# Patient Record
Sex: FEMALE | Race: WHITE | NOT HISPANIC OR LATINO | Employment: OTHER | URBAN - METROPOLITAN AREA
[De-identification: names, ages, dates, MRNs, and addresses within clinical notes are randomized per-mention and may not be internally consistent; named-entity substitution may affect disease eponyms.]

---

## 2019-08-18 ENCOUNTER — APPOINTMENT (OUTPATIENT)
Dept: RADIOLOGY | Facility: MEDICAL CENTER | Age: 84
DRG: 023 | End: 2019-08-18
Attending: EMERGENCY MEDICINE
Payer: MEDICARE

## 2019-08-18 ENCOUNTER — HOSPITAL ENCOUNTER (OUTPATIENT)
Dept: RADIOLOGY | Facility: MEDICAL CENTER | Age: 84
End: 2019-08-18

## 2019-08-18 ENCOUNTER — APPOINTMENT (OUTPATIENT)
Dept: RADIOLOGY | Facility: MEDICAL CENTER | Age: 84
DRG: 023 | End: 2019-08-18
Attending: RADIOLOGY
Payer: MEDICARE

## 2019-08-18 ENCOUNTER — HOSPITAL ENCOUNTER (INPATIENT)
Facility: MEDICAL CENTER | Age: 84
LOS: 2 days | DRG: 023 | End: 2019-08-20
Attending: EMERGENCY MEDICINE | Admitting: HOSPITALIST
Payer: MEDICARE

## 2019-08-18 ENCOUNTER — HOSPITAL ENCOUNTER (OUTPATIENT)
Facility: MEDICAL CENTER | Age: 84
End: 2019-08-18

## 2019-08-18 DIAGNOSIS — I63.322 CEREBROVASCULAR ACCIDENT (CVA) DUE TO THROMBOSIS OF LEFT ANTERIOR CEREBRAL ARTERY (HCC): ICD-10-CM

## 2019-08-18 DIAGNOSIS — I48.91 NEW ONSET A-FIB (HCC): ICD-10-CM

## 2019-08-18 PROBLEM — G81.91 RIGHT HEMIPARESIS (HCC): Status: ACTIVE | Noted: 2019-08-18

## 2019-08-18 PROBLEM — I66.12: Status: ACTIVE | Noted: 2019-08-18

## 2019-08-18 PROBLEM — N17.9 AKI (ACUTE KIDNEY INJURY) (HCC): Status: ACTIVE | Noted: 2019-08-18

## 2019-08-18 PROBLEM — N19 RENAL FAILURE: Status: ACTIVE | Noted: 2019-08-18

## 2019-08-18 PROBLEM — I10 HTN (HYPERTENSION): Status: ACTIVE | Noted: 2019-08-18

## 2019-08-18 LAB
ABO + RH BLD: NORMAL
ABO GROUP BLD: NORMAL
ALBUMIN SERPL BCP-MCNC: 4 G/DL (ref 3.2–4.9)
ALBUMIN/GLOB SERPL: 1.3 G/DL
ALP SERPL-CCNC: 39 U/L (ref 30–99)
ALT SERPL-CCNC: 15 U/L (ref 2–50)
ANION GAP SERPL CALC-SCNC: 10 MMOL/L (ref 0–11.9)
APPEARANCE UR: CLEAR
APTT PPP: 50.1 SEC (ref 24.7–36)
AST SERPL-CCNC: 22 U/L (ref 12–45)
BACTERIA #/AREA URNS HPF: NEGATIVE /HPF
BASOPHILS # BLD AUTO: 0.4 % (ref 0–1.8)
BASOPHILS # BLD: 0.04 K/UL (ref 0–0.12)
BILIRUB SERPL-MCNC: 1 MG/DL (ref 0.1–1.5)
BILIRUB UR QL STRIP.AUTO: NEGATIVE
BLD GP AB SCN SERPL QL: NORMAL
BUN SERPL-MCNC: 16 MG/DL (ref 8–22)
CALCIUM SERPL-MCNC: 8.8 MG/DL (ref 8.5–10.5)
CHLORIDE SERPL-SCNC: 102 MMOL/L (ref 96–112)
CO2 SERPL-SCNC: 25 MMOL/L (ref 20–33)
COLOR UR: YELLOW
CREAT SERPL-MCNC: 0.96 MG/DL (ref 0.5–1.4)
EOSINOPHIL # BLD AUTO: 0.03 K/UL (ref 0–0.51)
EOSINOPHIL NFR BLD: 0.3 % (ref 0–6.9)
EPI CELLS #/AREA URNS HPF: NEGATIVE /HPF
ERYTHROCYTE [DISTWIDTH] IN BLOOD BY AUTOMATED COUNT: 42.9 FL (ref 35.9–50)
GLOBULIN SER CALC-MCNC: 3.2 G/DL (ref 1.9–3.5)
GLUCOSE SERPL-MCNC: 96 MG/DL (ref 65–99)
GLUCOSE UR STRIP.AUTO-MCNC: NEGATIVE MG/DL
HCT VFR BLD AUTO: 40.2 % (ref 37–47)
HGB BLD-MCNC: 13.5 G/DL (ref 12–16)
HYALINE CASTS #/AREA URNS LPF: ABNORMAL /LPF
IMM GRANULOCYTES # BLD AUTO: 0.03 K/UL (ref 0–0.11)
IMM GRANULOCYTES NFR BLD AUTO: 0.3 % (ref 0–0.9)
INR PPP: 1.03 (ref 0.87–1.13)
KETONES UR STRIP.AUTO-MCNC: ABNORMAL MG/DL
LEUKOCYTE ESTERASE UR QL STRIP.AUTO: NEGATIVE
LYMPHOCYTES # BLD AUTO: 1.07 K/UL (ref 1–4.8)
LYMPHOCYTES NFR BLD: 11 % (ref 22–41)
MAGNESIUM SERPL-MCNC: 1.7 MG/DL (ref 1.5–2.5)
MCH RBC QN AUTO: 31.7 PG (ref 27–33)
MCHC RBC AUTO-ENTMCNC: 33.6 G/DL (ref 33.6–35)
MCV RBC AUTO: 94.4 FL (ref 81.4–97.8)
MICRO URNS: ABNORMAL
MONOCYTES # BLD AUTO: 0.65 K/UL (ref 0–0.85)
MONOCYTES NFR BLD AUTO: 6.7 % (ref 0–13.4)
NEUTROPHILS # BLD AUTO: 7.93 K/UL (ref 2–7.15)
NEUTROPHILS NFR BLD: 81.3 % (ref 44–72)
NITRITE UR QL STRIP.AUTO: NEGATIVE
NRBC # BLD AUTO: 0 K/UL
NRBC BLD-RTO: 0 /100 WBC
PH UR STRIP.AUTO: 8.5 [PH] (ref 5–8)
PLATELET # BLD AUTO: 225 K/UL (ref 164–446)
PMV BLD AUTO: 9.8 FL (ref 9–12.9)
POTASSIUM SERPL-SCNC: 3.4 MMOL/L (ref 3.6–5.5)
PROT SERPL-MCNC: 7.2 G/DL (ref 6–8.2)
PROT UR QL STRIP: NEGATIVE MG/DL
PROTHROMBIN TIME: 13.8 SEC (ref 12–14.6)
RBC # BLD AUTO: 4.26 M/UL (ref 4.2–5.4)
RBC # URNS HPF: ABNORMAL /HPF
RBC UR QL AUTO: ABNORMAL
RH BLD: NORMAL
SODIUM SERPL-SCNC: 137 MMOL/L (ref 135–145)
SP GR UR STRIP.AUTO: 1.02
T4 FREE SERPL-MCNC: 0.99 NG/DL (ref 0.53–1.43)
TROPONIN T SERPL-MCNC: 15 NG/L (ref 6–19)
TSH SERPL DL<=0.005 MIU/L-ACNC: 3.38 UIU/ML (ref 0.38–5.33)
UROBILINOGEN UR STRIP.AUTO-MCNC: 0.2 MG/DL
WBC # BLD AUTO: 9.8 K/UL (ref 4.8–10.8)
WBC #/AREA URNS HPF: ABNORMAL /HPF

## 2019-08-18 PROCEDURE — B3141ZZ FLUOROSCOPY OF LEFT COMMON CAROTID ARTERY USING LOW OSMOLAR CONTRAST: ICD-10-PCS | Performed by: RADIOLOGY

## 2019-08-18 PROCEDURE — 96375 TX/PRO/DX INJ NEW DRUG ADDON: CPT

## 2019-08-18 PROCEDURE — 84443 ASSAY THYROID STIM HORMONE: CPT

## 2019-08-18 PROCEDURE — 71045 X-RAY EXAM CHEST 1 VIEW: CPT

## 2019-08-18 PROCEDURE — 99285 EMERGENCY DEPT VISIT HI MDM: CPT | Performed by: PSYCHIATRY & NEUROLOGY

## 2019-08-18 PROCEDURE — 81001 URINALYSIS AUTO W/SCOPE: CPT

## 2019-08-18 PROCEDURE — 96374 THER/PROPH/DIAG INJ IV PUSH: CPT

## 2019-08-18 PROCEDURE — 86901 BLOOD TYPING SEROLOGIC RH(D): CPT

## 2019-08-18 PROCEDURE — 700101 HCHG RX REV CODE 250: Performed by: INTERNAL MEDICINE

## 2019-08-18 PROCEDURE — 70450 CT HEAD/BRAIN W/O DYE: CPT

## 2019-08-18 PROCEDURE — 93005 ELECTROCARDIOGRAM TRACING: CPT | Performed by: EMERGENCY MEDICINE

## 2019-08-18 PROCEDURE — 86900 BLOOD TYPING SEROLOGIC ABO: CPT

## 2019-08-18 PROCEDURE — 84484 ASSAY OF TROPONIN QUANT: CPT | Mod: 91

## 2019-08-18 PROCEDURE — 85025 COMPLETE CBC W/AUTO DIFF WBC: CPT | Mod: 91

## 2019-08-18 PROCEDURE — 99291 CRITICAL CARE FIRST HOUR: CPT

## 2019-08-18 PROCEDURE — 0042T CT-CEREBRAL PERFUSION ANALYSIS: CPT

## 2019-08-18 PROCEDURE — 700111 HCHG RX REV CODE 636 W/ 250 OVERRIDE (IP)

## 2019-08-18 PROCEDURE — 85730 THROMBOPLASTIN TIME PARTIAL: CPT

## 2019-08-18 PROCEDURE — B3171ZZ FLUOROSCOPY OF LEFT INTERNAL CAROTID ARTERY USING LOW OSMOLAR CONTRAST: ICD-10-PCS | Performed by: RADIOLOGY

## 2019-08-18 PROCEDURE — 36415 COLL VENOUS BLD VENIPUNCTURE: CPT

## 2019-08-18 PROCEDURE — 70496 CT ANGIOGRAPHY HEAD: CPT

## 2019-08-18 PROCEDURE — 700101 HCHG RX REV CODE 250

## 2019-08-18 PROCEDURE — 84439 ASSAY OF FREE THYROXINE: CPT

## 2019-08-18 PROCEDURE — 80053 COMPREHEN METABOLIC PANEL: CPT | Mod: 91

## 2019-08-18 PROCEDURE — 99153 MOD SED SAME PHYS/QHP EA: CPT

## 2019-08-18 PROCEDURE — 85610 PROTHROMBIN TIME: CPT

## 2019-08-18 PROCEDURE — 83735 ASSAY OF MAGNESIUM: CPT

## 2019-08-18 PROCEDURE — 70498 CT ANGIOGRAPHY NECK: CPT

## 2019-08-18 PROCEDURE — 99291 CRITICAL CARE FIRST HOUR: CPT | Performed by: INTERNAL MEDICINE

## 2019-08-18 PROCEDURE — 03CG3ZZ EXTIRPATION OF MATTER FROM INTRACRANIAL ARTERY, PERCUTANEOUS APPROACH: ICD-10-PCS | Performed by: RADIOLOGY

## 2019-08-18 PROCEDURE — 86850 RBC ANTIBODY SCREEN: CPT

## 2019-08-18 PROCEDURE — 700105 HCHG RX REV CODE 258: Performed by: HOSPITALIST

## 2019-08-18 PROCEDURE — 700105 HCHG RX REV CODE 258: Performed by: INTERNAL MEDICINE

## 2019-08-18 PROCEDURE — 94760 N-INVAS EAR/PLS OXIMETRY 1: CPT

## 2019-08-18 PROCEDURE — 700117 HCHG RX CONTRAST REV CODE 255: Performed by: EMERGENCY MEDICINE

## 2019-08-18 PROCEDURE — 770022 HCHG ROOM/CARE - ICU (200)

## 2019-08-18 RX ORDER — ASPIRIN 81 MG/1
324 TABLET, CHEWABLE ORAL DAILY
Status: DISCONTINUED | OUTPATIENT
Start: 2019-08-19 | End: 2019-08-19

## 2019-08-18 RX ORDER — ASPIRIN 300 MG/1
300 SUPPOSITORY RECTAL DAILY
Status: DISCONTINUED | OUTPATIENT
Start: 2019-08-19 | End: 2019-08-19

## 2019-08-18 RX ORDER — AMOXICILLIN 250 MG
2 CAPSULE ORAL 2 TIMES DAILY
Status: DISCONTINUED | OUTPATIENT
Start: 2019-08-18 | End: 2019-08-19

## 2019-08-18 RX ORDER — LABETALOL HYDROCHLORIDE 5 MG/ML
10 INJECTION, SOLUTION INTRAVENOUS ONCE
Status: COMPLETED | OUTPATIENT
Start: 2019-08-18 | End: 2019-08-18

## 2019-08-18 RX ORDER — SODIUM CHLORIDE 9 MG/ML
500 INJECTION, SOLUTION INTRAVENOUS
Status: ACTIVE | OUTPATIENT
Start: 2019-08-18 | End: 2019-08-18

## 2019-08-18 RX ORDER — ONDANSETRON 2 MG/ML
4 INJECTION INTRAMUSCULAR; INTRAVENOUS PRN
Status: ACTIVE | OUTPATIENT
Start: 2019-08-18 | End: 2019-08-18

## 2019-08-18 RX ORDER — LABETALOL HYDROCHLORIDE 5 MG/ML
INJECTION, SOLUTION INTRAVENOUS
Status: COMPLETED
Start: 2019-08-18 | End: 2019-08-18

## 2019-08-18 RX ORDER — ASPIRIN 325 MG
325 TABLET ORAL DAILY
Status: DISCONTINUED | OUTPATIENT
Start: 2019-08-19 | End: 2019-08-19

## 2019-08-18 RX ORDER — BISACODYL 10 MG
10 SUPPOSITORY, RECTAL RECTAL
Status: DISCONTINUED | OUTPATIENT
Start: 2019-08-18 | End: 2019-08-19

## 2019-08-18 RX ORDER — NIFEDIPINE 30 MG
30 TABLET, EXTENDED RELEASE ORAL DAILY
Status: ON HOLD | COMMUNITY
Start: 2019-07-29 | End: 2019-08-20

## 2019-08-18 RX ORDER — GABAPENTIN 100 MG/1
100 CAPSULE ORAL 3 TIMES DAILY
Status: DISCONTINUED | OUTPATIENT
Start: 2019-08-18 | End: 2019-08-19

## 2019-08-18 RX ORDER — LABETALOL HYDROCHLORIDE 5 MG/ML
10 INJECTION, SOLUTION INTRAVENOUS
Status: DISCONTINUED | OUTPATIENT
Start: 2019-08-18 | End: 2019-08-18

## 2019-08-18 RX ORDER — POLYETHYLENE GLYCOL 3350 17 G/17G
1 POWDER, FOR SOLUTION ORAL
Status: DISCONTINUED | OUTPATIENT
Start: 2019-08-18 | End: 2019-08-19

## 2019-08-18 RX ORDER — MIDAZOLAM HYDROCHLORIDE 1 MG/ML
.5-2 INJECTION INTRAMUSCULAR; INTRAVENOUS PRN
Status: ACTIVE | OUTPATIENT
Start: 2019-08-18 | End: 2019-08-18

## 2019-08-18 RX ORDER — HYDRALAZINE HYDROCHLORIDE 20 MG/ML
20 INJECTION INTRAMUSCULAR; INTRAVENOUS EVERY 4 HOURS PRN
Status: DISCONTINUED | OUTPATIENT
Start: 2019-08-18 | End: 2019-08-18

## 2019-08-18 RX ORDER — ONDANSETRON 4 MG/1
4 TABLET, ORALLY DISINTEGRATING ORAL EVERY 4 HOURS PRN
Status: DISCONTINUED | OUTPATIENT
Start: 2019-08-18 | End: 2019-08-19

## 2019-08-18 RX ORDER — LEVOTHYROXINE SODIUM 0.03 MG/1
100 TABLET ORAL
Status: DISCONTINUED | OUTPATIENT
Start: 2019-08-19 | End: 2019-08-19

## 2019-08-18 RX ORDER — MIDAZOLAM HYDROCHLORIDE 1 MG/ML
INJECTION INTRAMUSCULAR; INTRAVENOUS
Status: COMPLETED
Start: 2019-08-18 | End: 2019-08-18

## 2019-08-18 RX ORDER — HYDRALAZINE HYDROCHLORIDE 20 MG/ML
20 INJECTION INTRAMUSCULAR; INTRAVENOUS EVERY 6 HOURS PRN
Status: DISCONTINUED | OUTPATIENT
Start: 2019-08-18 | End: 2019-08-19

## 2019-08-18 RX ORDER — CHLORAL HYDRATE 500 MG
1000 CAPSULE ORAL DAILY
Status: ON HOLD | COMMUNITY
End: 2019-08-20

## 2019-08-18 RX ORDER — ACETAMINOPHEN 325 MG/1
650 TABLET ORAL EVERY 6 HOURS PRN
Status: DISCONTINUED | OUTPATIENT
Start: 2019-08-18 | End: 2019-08-19

## 2019-08-18 RX ORDER — LABETALOL HYDROCHLORIDE 5 MG/ML
10 INJECTION, SOLUTION INTRAVENOUS
Status: DISCONTINUED | OUTPATIENT
Start: 2019-08-18 | End: 2019-08-19

## 2019-08-18 RX ORDER — SODIUM CHLORIDE, SODIUM LACTATE, POTASSIUM CHLORIDE, CALCIUM CHLORIDE 600; 310; 30; 20 MG/100ML; MG/100ML; MG/100ML; MG/100ML
INJECTION, SOLUTION INTRAVENOUS CONTINUOUS
Status: DISCONTINUED | OUTPATIENT
Start: 2019-08-18 | End: 2019-08-19

## 2019-08-18 RX ORDER — ONDANSETRON 2 MG/ML
4 INJECTION INTRAMUSCULAR; INTRAVENOUS EVERY 4 HOURS PRN
Status: DISCONTINUED | OUTPATIENT
Start: 2019-08-18 | End: 2019-08-19

## 2019-08-18 RX ORDER — GABAPENTIN 300 MG/1
CAPSULE ORAL
COMMUNITY
Start: 2019-06-15 | End: 2019-08-18

## 2019-08-18 RX ORDER — ASPIRIN 325 MG
325 TABLET ORAL EVERY 6 HOURS PRN
Status: ON HOLD | COMMUNITY
End: 2019-08-20

## 2019-08-18 RX ADMIN — LABETALOL HYDROCHLORIDE 10 MG: 5 INJECTION, SOLUTION INTRAVENOUS at 19:06

## 2019-08-18 RX ADMIN — LABETALOL HYDROCHLORIDE 10 MG: 5 INJECTION, SOLUTION INTRAVENOUS at 13:03

## 2019-08-18 RX ADMIN — SODIUM CHLORIDE, POTASSIUM CHLORIDE, SODIUM LACTATE AND CALCIUM CHLORIDE: 600; 310; 30; 20 INJECTION, SOLUTION INTRAVENOUS at 13:53

## 2019-08-18 RX ADMIN — SODIUM CHLORIDE 5 MG/HR: 9 INJECTION, SOLUTION INTRAVENOUS at 19:44

## 2019-08-18 RX ADMIN — MIDAZOLAM HYDROCHLORIDE 1 MG: 1 INJECTION INTRAMUSCULAR; INTRAVENOUS at 12:13

## 2019-08-18 RX ADMIN — LABETALOL HYDROCHLORIDE 10 MG: 5 INJECTION, SOLUTION INTRAVENOUS at 13:20

## 2019-08-18 RX ADMIN — SODIUM CHLORIDE, POTASSIUM CHLORIDE, SODIUM LACTATE AND CALCIUM CHLORIDE: 600; 310; 30; 20 INJECTION, SOLUTION INTRAVENOUS at 23:06

## 2019-08-18 RX ADMIN — LABETALOL HYDROCHLORIDE 10 MG: 5 INJECTION, SOLUTION INTRAVENOUS at 15:20

## 2019-08-18 RX ADMIN — FENTANYL CITRATE 25 MCG: 0.05 INJECTION, SOLUTION INTRAMUSCULAR; INTRAVENOUS at 12:21

## 2019-08-18 RX ADMIN — MIDAZOLAM HYDROCHLORIDE 1 MG: 1 INJECTION, SOLUTION INTRAMUSCULAR; INTRAVENOUS at 12:13

## 2019-08-18 RX ADMIN — IOHEXOL 120 ML: 350 INJECTION, SOLUTION INTRAVENOUS at 10:46

## 2019-08-18 ASSESSMENT — PATIENT HEALTH QUESTIONNAIRE - PHQ9
SUM OF ALL RESPONSES TO PHQ9 QUESTIONS 1 AND 2: 0
2. FEELING DOWN, DEPRESSED, IRRITABLE, OR HOPELESS: NOT AT ALL
1. LITTLE INTEREST OR PLEASURE IN DOING THINGS: NOT AT ALL

## 2019-08-18 ASSESSMENT — LIFESTYLE VARIABLES: DOES PATIENT WANT TO STOP DRINKING: NO

## 2019-08-18 NOTE — CONSULTS
Critical Care Consultation    Date of consult: 8/18/2019    Referring Physician  Anika Waite D.O.    Reason for Consultation  Left HAZEL distribution cerebrovascular accident    History of Presenting Illness  89 y.o. female with a past medical history of hypertension, hypothyroidism who presented 8/18/2019 right hemiparesis, patient was last seen normal at approximately 10 PM yesterday and found by daughter this morning with dense right hemiparesis.  The patient was taken to the emergency department where she was found to have new onset atrial fibrillation and TPA was withheld given her unknown last normal time.  Patient was transferred to [facility] for higher level of care, a CTA of the head and neck was completed which showed a complete occlusion of the proximal left HAZEL and no flow limiting stenosis of the carotid or vertebral arteries.  Dr. Cordon with interventional radiology is planning to take the patient for thrombectomy.  She will be admitted to the ICU post procedurally for further care and monitoring stroke.    Per ERP the patient's baseline level of functioning is very high and she was actually hiking yesterday.    Code Status  Full Code    Review of Systems  Review of Systems   Unable to perform ROS: Mental acuity       Past Medical History   has a past medical history of A-fib (HCC), Hypertension, and Hypothyroid.    Surgical History   has no past surgical history on file.    Family History  family history is not on file.    Social History   reports that she has never smoked. She has never used smokeless tobacco. She reports that she does not drink alcohol or use drugs.    Medications  Home Medications     Reviewed by Gregoria Clement, Pharmacy Intern (Pharmacy Intern) on 08/18/19 at 1111  Med List Status: Partial   Medication Last Dose Status   amLODIPine (NORVASC) 10 MG Tab  Active   gabapentin (NEURONTIN) 300 MG Cap  Active   HYDROcodone-acetaminophen (NORCO) 5-325 MG Tab per  tablet  Active   levothyroxine (SYNTHROID) 100 MCG Tab  Active   NIFEdipine (ADALAT CC) 30 MG CR tablet  Active              No current facility-administered medications for this encounter.      Current Outpatient Medications   Medication Sig Dispense Refill   • amLODIPine (NORVASC) 10 MG Tab Take 10 mg by mouth every day. Unknown dosage     • levothyroxine (SYNTHROID) 100 MCG Tab Take 100 mcg by mouth Every morning on an empty stomach. Unknown dosage     • HYDROcodone-acetaminophen (NORCO) 5-325 MG Tab per tablet Take 1-2 Tabs by mouth every four hours as needed.     • NIFEdipine (ADALAT CC) 30 MG CR tablet      • gabapentin (NEURONTIN) 300 MG Cap          Allergies  No Known Allergies    Vital Signs last 24 hours  Temp:  [36.6 °C (97.9 °F)] 36.6 °C (97.9 °F)  Pulse:  [74-85] 85  Resp:  [7-25] 25  BP: (178-191)/() 178/107  SpO2:  [90 %-98 %] 98 %    Physical Exam  Physical Exam   Constitutional: She appears well-developed and well-nourished.   HENT:   Head: Normocephalic and atraumatic.   Right Ear: External ear normal.   Left Ear: External ear normal.   Nose: Nose normal.   Eyes: Pupils are equal, round, and reactive to light. Conjunctivae are normal.   Neck: Neck supple. No JVD present. No tracheal deviation present.   Cardiovascular: Normal rate, regular rhythm and intact distal pulses.   Pulmonary/Chest: Breath sounds normal. No accessory muscle usage. No respiratory distress.   Abdominal: Soft. Bowel sounds are normal. She exhibits no distension. There is no tenderness.   Musculoskeletal: Normal range of motion. She exhibits no tenderness or deformity.   Neurological: A cranial nerve deficit is present. She exhibits abnormal muscle tone. Coordination abnormal.   Dense right upper extremity paraplegia, right lower extremity weakly withdraws to pain.  Gaze deviated left with inability to move past midline. Expressive and receptive aphasia.    Initial NIHSS: 22   Skin: Skin is warm and dry. No rash noted.    Psychiatric: She has a normal mood and affect. Her behavior is normal.   Nursing note and vitals reviewed.      Fluids  No intake or output data in the 24 hours ending 08/18/19 1116    Laboratory  Recent Results (from the past 48 hour(s))   CBC WITH DIFFERENTIAL    Collection Time: 08/18/19  8:45 AM   Result Value Ref Range    WBC 10.2 4.8 - 10.8 K/uL    RBC 4.86 4.20 - 5.40 M/uL    Hemoglobin 15.5 13.0 - 17.0 g/dL    Hematocrit 45.0 39.0 - 50.0 %    MCV 92.6 81.0 - 99.0 fL    MCH 31.9 28.4 - 32.7 pg    MCHC 34.4 33.0 - 37.0 g/dL    RDW 12.4 11.5 - 14.5 %    Platelet Count 250 130 - 400 K/uL    MPV 10.0 7.4 - 10.4 fL    Neutrophils Automated 81.4 (H) 39.0 - 70.0 %    Lymphocytes Automated 9.7 (L) 21.0 - 50.0 %    Monocytes Automated 7.3 1.7 - 10.0 %    Eosinophils Automated 1.0 0.0 - 5.0 %    Basophils Automated 0.6 0.0 - 3.0 %    Abs Neutrophils Automated 8.3 (H) 1.8 - 7.7 K/uL    Abs Lymph Automated 1.0 (L) 1.2 - 4.8 K/uL    Monos (Absolute) 0.7 0.2 - 0.9 K/uL   COMP METABOLIC PANEL    Collection Time: 08/18/19  8:45 AM   Result Value Ref Range    Sodium 136 136 - 145 mmol/L    Potassium 4.3 3.5 - 5.1 mmol/L    Chloride 97 (L) 98 - 107 mmol/L    Co2 30 (H) 20 - 29 mmol/L    Anion Gap 9 4 - 12 mmol/L    Glucose 118 (H) 70 - 100 mg/dL    Bun 20 (H) 6 - 18 mg/dL    Creatinine 1.2 (H) 0.6 - 1.0 mg/dL    Calcium 10.0 8.5 - 10.1 mg/dL    AST(SGOT) 28 10 - 37 U/L    ALT(SGPT) 29 12 - 78 U/L    Alkaline Phosphatase 58 34 - 120 U/L    Total Bilirubin 1.2 0.1 - 1.2 mg/dL    Albumin 4.2 3.5 - 5.0 g/dL    Total Protein 8.6 (H) 6.4 - 8.3 g/dL    A-G Ratio 1.0    TROPONIN    Collection Time: 08/18/19  8:45 AM   Result Value Ref Range    Troponin I <0.02 0.00 - 0.06 ng/mL   ESTIMATED GFR    Collection Time: 08/18/19  8:45 AM   Result Value Ref Range    GFR If  51 (A) >60 mL/min/1.73 m 2    GFR If Non  42 (A) >60 mL/min/1.73 m 2   URINALYSIS CULTURE, IF INDICATED    Collection Time: 08/18/19   9:00 AM   Result Value Ref Range    Color YELLOW     Character CLEAR     Specific Gravity 1.010 <1.035    Ph 7.5 5.0 - 8.0    Glucose NEGATIVE Negative mg/dL    Ketones NEGATIVE Negative mg/dL    Protein TRACE (A) Negative mg/dL    Bilirubin NEGATIVE Negative    Urobilinogen, Urine 0.2 Negative    Nitrite NEGATIVE Negative    Leukocyte Esterase NEGATIVE Negative    Occult Blood TRACE (A) Negative    Culture Indicated No UA Culture   URINE MICROSCOPIC (W/UA)    Collection Time: 08/18/19  9:00 AM   Result Value Ref Range    WBC Rare 0 - 6 /hpf    RBC Rare 0 - 3 /hpf    Bacteria None Seen None /hpf    Epithelial Cells None Seen /hpf    Urine Other See Below    CBC WITH DIFFERENTIAL    Collection Time: 08/18/19 10:26 AM   Result Value Ref Range    WBC 9.8 4.8 - 10.8 K/uL    RBC 4.26 4.20 - 5.40 M/uL    Hemoglobin 13.5 12.0 - 16.0 g/dL    Hematocrit 40.2 37.0 - 47.0 %    MCV 94.4 81.4 - 97.8 fL    MCH 31.7 27.0 - 33.0 pg    MCHC 33.6 33.6 - 35.0 g/dL    RDW 42.9 35.9 - 50.0 fL    Platelet Count 225 164 - 446 K/uL    MPV 9.8 9.0 - 12.9 fL    Neutrophils-Polys 81.30 (H) 44.00 - 72.00 %    Lymphocytes 11.00 (L) 22.00 - 41.00 %    Monocytes 6.70 0.00 - 13.40 %    Eosinophils 0.30 0.00 - 6.90 %    Basophils 0.40 0.00 - 1.80 %    Immature Granulocytes 0.30 0.00 - 0.90 %    Nucleated RBC 0.00 /100 WBC    Neutrophils (Absolute) 7.93 (H) 2.00 - 7.15 K/uL    Lymphs (Absolute) 1.07 1.00 - 4.80 K/uL    Monos (Absolute) 0.65 0.00 - 0.85 K/uL    Eos (Absolute) 0.03 0.00 - 0.51 K/uL    Baso (Absolute) 0.04 0.00 - 0.12 K/uL    Immature Granulocytes (abs) 0.03 0.00 - 0.11 K/uL    NRBC (Absolute) 0.00 K/uL   COMP METABOLIC PANEL    Collection Time: 08/18/19 10:26 AM   Result Value Ref Range    Sodium 137 135 - 145 mmol/L    Potassium 3.4 (L) 3.6 - 5.5 mmol/L    Chloride 102 96 - 112 mmol/L    Co2 25 20 - 33 mmol/L    Anion Gap 10.0 0.0 - 11.9    Glucose 96 65 - 99 mg/dL    Bun 16 8 - 22 mg/dL    Creatinine 0.96 0.50 - 1.40 mg/dL     Calcium 8.8 8.5 - 10.5 mg/dL    AST(SGOT) 22 12 - 45 U/L    ALT(SGPT) 15 2 - 50 U/L    Alkaline Phosphatase 39 30 - 99 U/L    Total Bilirubin 1.0 0.1 - 1.5 mg/dL    Albumin 4.0 3.2 - 4.9 g/dL    Total Protein 7.2 6.0 - 8.2 g/dL    Globulin 3.2 1.9 - 3.5 g/dL    A-G Ratio 1.3 g/dL   PROTHROMBIN TIME    Collection Time: 19 10:26 AM   Result Value Ref Range    PT 13.8 12.0 - 14.6 sec    INR 1.03 0.87 - 1.13   APTT    Collection Time: 19 10:26 AM   Result Value Ref Range    APTT 50.1 (H) 24.7 - 36.0 sec   TROPONIN    Collection Time: 19 10:26 AM   Result Value Ref Range    Troponin T 15 6 - 19 ng/L   ESTIMATED GFR    Collection Time: 19 10:26 AM   Result Value Ref Range    GFR If African American >60 >60 mL/min/1.73 m 2    GFR If Non African American 55 (A) >60 mL/min/1.73 m 2   EKG (NOW)    Collection Time: 19 10:51 AM   Result Value Ref Range    Report       [facility] Emergency Dept[facility]    Test Date:  2019  Pt Name:    CHAD PENDLETON              Department: ER  MRN:        [id number]                      Room:       [facility]  Gender:     Female                       Technician: [id number]  :        [id number]                   Requested By:AISSATOU JOHNSON  Order #:    [id number]                    Reading MD:    Measurements  Intervals                                Axis  Rate:       88                           P:  VA:                                      QRS:        84  QRSD:       84                           T:          62  QT:         392  QTc:        475    Interpretive Statements  ATRIAL FIBRILLATION  BORDERLINE RIGHT AXIS DEVIATION  CONSIDER LEFT VENTRICULAR HYPERTROPHY  No previous ECG available for comparison     URINALYSIS CULTURE, IF INDICATED    Collection Time: 19 11:00 AM   Result Value Ref Range    Color Yellow     Character Clear     Specific Gravity 1.023 <1.035    Ph 8.5 (A) 5.0 - 8.0    Glucose Negative Negative mg/dL    Ketones Trace (A) Negative  mg/dL    Protein Negative Negative mg/dL    Bilirubin Negative Negative    Urobilinogen, Urine 0.2 Negative    Nitrite Negative Negative    Leukocyte Esterase Negative Negative    Occult Blood Trace (A) Negative    Micro Urine Req Microscopic    URINE MICROSCOPIC (W/UA)    Collection Time: 08/18/19 11:00 AM   Result Value Ref Range    WBC 0-2 /hpf    RBC 2-5 (A) /hpf    Bacteria Negative None /hpf    Epithelial Cells Negative /hpf    Hyaline Cast 0-2 /lpf       Imaging  IR-THROMBO MECHANICAL ARTERY,INIT   Final Result         1.  Occlusion of the A2 segment of the left anterior cerebral artery.      2.  Successful cerebral thrombectomy performed with post intervention angiogram demonstrating widely patent anterior cerebral arteries.               DX-CHEST-PORTABLE (1 VIEW)   Final Result         1. No significant interval change. No pulmonary infiltrates or consolidations are noted.      CT-CTA HEAD WITH & W/O-POST PROCESS   Final Result         1. Complete occlusion of the proximal left HAZEL.      CRITICAL RESULT READ BACK: Preliminary findings discussed with and critical read back performed by Dr. AISSATOU JOHNSON in the Emergency Department via telephone on 8/18/2019 10:54 AM . IR is informed      CT-CTA NECK WITH & W/O-POST PROCESSING   Final Result      1. Moderate calcified atherosclerotic disease of the left carotid bulb. No appreciable calcified atherosclerotic disease of the right carotid bulb.      2. No evidence of flow-limiting stenosis in the cervical carotid or cervical vertebral arteries.      CT-CEREBRAL PERFUSION ANALYSIS   Final Result      1.  Cerebral blood flow less than 30% in the anterior medial left frontal lobe, likely representing completed infarct = 8 mL.      2.  T Max more than 6 seconds, in the left frontal lobe, likely representing combination of completed infarct and ischemia = 44 mL.      3.  Mismatched volume likely representing ischemic brain/penumbra = 36      4.  Please note that the  cerebral perfusion was performed on the limited brain tissue around the basal ganglia region. Infarct/ischemia outside the CT perfusion sections can be missed in this study.      CT-HEAD W/O   Final Result         1. No evidence of acute intracranial hemorrhage or mass lesion.      2. White matter lucencies most consistent with chronic small vessel ischemic change.               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  Prognosis poor.    AF (atrial fibrillation) (HCC)- (present on admission)  Assessment & Plan  New diagnosis  Cardiac monitoring  TSH pending  Echo  Rate control, unknown onset      Discussed patient condition and risk of morbidity and/or mortality with Hospitalist, RN, RT, Pharmacy, Charge nurse / hot rounds, Patient, neurology and IR.      The patient remains critically ill.  Critical care time = 48 minutes in directly providing and coordinating critical care and extensive data review.  No time overlap and excludes procedures.

## 2019-08-18 NOTE — PROGRESS NOTES
IR Procedure Note:    Dr. Cordon consented patient's daughter prior to procedure; all questions answered.    Site confirmed with imaging guidance by patient, physician, RT, and RN.     Dr. Cordon completed a carotid cerebral angiogram with mechanical thrombolysis.  The patient tolerated the procedure well; ETCo2 range 28-31, with consistent waveform during the procedure.      Tegaderm, gauze and angioseal applied to right groin, CDI and soft; pressure held x 5 minutes.  Patient alert and following commands, but nonverbal post procedure, patient exhibited episodes of hypertension during and after procedure. Dr. Cordon aware.  Midazolam and fentanyl during procedure. Labetolol post procedure. See flow sheet for vital signs.  Report given to CHRISTIANO Calderon.  [place], ICU RN transported patient to R116 with SaO2 monitor @ 98 % on oxygen via NC on  2 lpm.     Sedation End Time: 1314  Angioseal Deployment time: 1251

## 2019-08-18 NOTE — ED NOTES
Med rec complete per pt family and pill identifying   NKDA  Pt family states pt finished an ABX about a week ago

## 2019-08-18 NOTE — OR SURGEON
Immediate Post- Operative Note        PostOp Diagnosis: Left A-2 Occlusion      Procedure(s): Successful Mechanical Thrombectomy of Left A-2.    Estimated Blood Loss: Less than 5 ml    Complications: None      8/18/2019     1:01 PM     Nahid Cordon

## 2019-08-18 NOTE — ASSESSMENT & PLAN NOTE
Permissive hypertension for the next 24 to 48 hours  On nicardipine drip to keep systolic blood pressure less than 140 and diastolic blood pressure less than 105.

## 2019-08-18 NOTE — H&P
Hospital Medicine History & Physical Note    Date of Service  8/18/2019    Primary Care Physician  Pcp Not In Computer    Consultants  Neurology    Code Status  Full    Chief Complaint  Right hemiparesis    History of Presenting Illness  89 y.o. female who presented 8/18/2019.  She is apparently quite active and lives independently.  She had 2 miles only yesterday.  History is obtained from discussion with EMS, the family is not currently present the patient is unable to participate.  Apparently she had been a little bit more fatigued yesterday than his normal but was otherwise feeling in her normal state of health.  She was last seen well at around 10:00 last night.  Her daughter found her this morning unresponsive.  EMS was called and they found the patient with right hemiparesis.  Her telemetry monitoring was significant for atrial fibrillation.  This is apparently a new diagnosis for her.  She had rates under 100.    Emergent imaging here in the ED has demonstrated an acute left HAZEL occlusion.  She is felt not to be a TPA candidate given the unknown time of onset.  IR has evaluated the imaging and is coming in to do an emergent thrombectomy.  She has remained in atrial fibrillation with controlled rates.    Discussed with neurology and pulmonology critical care    Review of Systems  Review of Systems   Unable to perform ROS: Other       Past Medical History   has a past medical history of A-fib (HCC), Hypertension, and Hypothyroid.    Surgical History  Unable to determine as patient is nonverbal and family not available    Family History  Unable to determine as patient is nonverbal and family not available.  Likely not to be of any relevance in a 89-year-old female    Social History   reports that she has never smoked. She has never used smokeless tobacco. She reports that she does not drink alcohol or use drugs.    Allergies  No Known Allergies    Medications  Prior to Admission Medications   Prescriptions Last  Dose Informant Patient Reported? Taking?   HYDROcodone-acetaminophen (NORCO) 5-325 MG Tab per tablet   Yes No   Sig: Take 1-2 Tabs by mouth every four hours as needed.   NIFEdipine (ADALAT CC) 30 MG CR tablet   Yes No   amLODIPine (NORVASC) 10 MG Tab   Yes No   Sig: Take 10 mg by mouth every day. Unknown dosage   gabapentin (NEURONTIN) 300 MG Cap   Yes No   levothyroxine (SYNTHROID) 100 MCG Tab   Yes No   Sig: Take 100 mcg by mouth Every morning on an empty stomach. Unknown dosage      Facility-Administered Medications: None       Physical Exam  Temp:  [36.6 °C (97.9 °F)] 36.6 °C (97.9 °F)  Pulse:  [74-85] 85  Resp:  [7-25] 25  BP: (178-191)/() 178/107  SpO2:  [90 %-98 %] 98 %    Physical Exam   Constitutional: She appears well-developed and well-nourished. No distress.   HENT:   Head: Normocephalic and atraumatic.   Nose: Nose normal.   Mouth/Throat: Oropharynx is clear and moist.   Eyes: Conjunctivae are normal. No scleral icterus.   Neck: No JVD present.   Cardiovascular: Normal rate. An irregularly irregular rhythm present.   Murmur heard.  Atrial fibrillation with rates in the 70s to 80s on the monitor   Pulmonary/Chest: Effort normal. No stridor. No respiratory distress. She has no wheezes. She has no rales.   Abdominal: Soft. There is no tenderness. There is no rebound and no guarding.   Musculoskeletal: She exhibits no edema.   Neurological: She is alert.   Patient does not move right side voluntarily on my examination.  Left side strength appears to be normal however she has dense expressive and receptive aphasia and only follows visual cues.  Exam is therefore limited.  Slight right facial droop.  Withdraws right foot pain   Skin: Skin is warm and dry. No rash noted. She is not diaphoretic.   Nursing note and vitals reviewed.      Laboratory:  Recent Labs     08/18/19  0845 08/18/19  1026   WBC 10.2 9.8   RBC 4.86 4.26   HEMOGLOBIN 15.5 13.5   HEMATOCRIT 45.0 40.2   MCV 92.6 94.4   MCH 31.9 31.7    MCHC 34.4 33.6   RDW 12.4 42.9   PLATELETCT 250 225   MPV 10.0 9.8     Recent Labs     08/18/19  0845 08/18/19  1026   SODIUM 136 137   POTASSIUM 4.3 3.4*   CHLORIDE 97* 102   CO2 30* 25   GLUCOSE 118* 96   BUN 20* 16   CREATININE 1.2* 0.96   CALCIUM 10.0 8.8     Recent Labs     08/18/19  0845 08/18/19  1026   ALTSGPT 29 15   ASTSGOT 28 22   ALKPHOSPHAT 58 39   TBILIRUBIN 1.2 1.0   GLUCOSE 118* 96     Recent Labs     08/18/19  1026   APTT 50.1*   INR 1.03     No results for input(s): NTPROBNP in the last 72 hours.      Recent Labs     08/18/19  1026   TROPONINT 15       Urinalysis:    Recent Labs     08/18/19  1100   SPECGRAVITY 1.023   GLUCOSEUR Negative   KETONES Trace*   NITRITE Negative   LEUKESTERAS Negative   WBCURINE 0-2   RBCURINE 2-5*   BACTERIA Negative   EPITHELCELL Negative        Imaging:  CT-CTA HEAD WITH & W/O-POST PROCESS   Final Result         1. Complete occlusion of the proximal left HAZEL.      CRITICAL RESULT READ BACK: Preliminary findings discussed with and critical read back performed by Dr. AISSATOU JOHNSON in the Emergency Department via telephone on 8/18/2019 10:54 AM . IR is informed      CT-CTA NECK WITH & W/O-POST PROCESSING   Final Result      1. Moderate calcified atherosclerotic disease of the left carotid bulb. No appreciable calcified atherosclerotic disease of the right carotid bulb.      2. No evidence of flow-limiting stenosis in the cervical carotid or cervical vertebral arteries.      CT-CEREBRAL PERFUSION ANALYSIS   Final Result      1.  Cerebral blood flow less than 30% in the anterior medial left frontal lobe, likely representing completed infarct = 8 mL.      2.  T Max more than 6 seconds, in the left frontal lobe, likely representing combination of completed infarct and ischemia = 44 mL.      3.  Mismatched volume likely representing ischemic brain/penumbra = 36      4.  Please note that the cerebral perfusion was performed on the limited brain tissue around the basal ganglia  region. Infarct/ischemia outside the CT perfusion sections can be missed in this study.      CT-HEAD W/O   Final Result         1. No evidence of acute intracranial hemorrhage or mass lesion.      2. White matter lucencies most consistent with chronic small vessel ischemic change.               DX-CHEST-PORTABLE (1 VIEW)    (Results Pending)   EC-ECHOCARDIOGRAM COMPLETE W/O CONT    (Results Pending)   MR-BRAIN-W/O    (Results Pending)         Assessment/Plan:  I anticipate this patient will require at least two midnights for appropriate medical management, necessitating inpatient admission.    No new Assessment & Plan notes have been filed under this hospital service since the last note was generated.  Service: Hospital Medicine      VTE prophylaxis: Patient will eventually need full dose anti-coagulation

## 2019-08-18 NOTE — THERAPY
Speech Therapy Contact Note:  Order received and verified for a clinical swallow evaluation. Per RN, pt not awake, non-verbal, and not following commands so is not appropriate for swallow evaluation at this time. SLP to follow as pt is appropriate.

## 2019-08-18 NOTE — ASSESSMENT & PLAN NOTE
Secondary to the left HAZEL occlusion.  MRI showing moderate to large left frontal/temporal infarct with component of hemorrhagic conversion.  Await further input from neurology but family looking toward potential hospice.  Will place orders for case management to look toward possible home hospice setting.

## 2019-08-18 NOTE — ED PROVIDER NOTES
ED Provider Note    ED Provider Note    Primary care provider: Pcp Not In Computer  Means of arrival: EMS, transfer  History obtained from: chart, records from transferring facility, daughter  History limited by: Medical condition    CHIEF COMPLAINT  Chief Complaint   Patient presents with   • Possible Stroke     stroke transfer from [facility], Last Known Well at 10pm last night woke up having right sided weakness and aphasia. has NIHSS of 24 from transferring facility. has hx of HTN/thyroid disease/New onset of afib.       GENEVA Carolina is a 89 y.o. female who presents to the Emergency Department via EMS transfer from [facility].  This is a previously healthy 9-year-old female who was last seen well at 10 PM last night.  Her daughter found her this morning, in bed, with dense right-sided paralysis, nonverbal.  Patient was imaged at an outlying facility but no CTA was performed it was was not available.  Patient transferred here for further stroke care.  History available reveals a history of thyroid disease.  She was found to be in atrial fibrillation at the outlying facility.  Apparently new.    REVIEW OF SYSTEMS  Review of Systems   Unable to perform ROS: Mental status change       PAST MEDICAL HISTORY   has a past medical history of A-fib (HCC), Hypertension, and Hypothyroid.  Thyroid disease    SURGICAL HISTORY  patient denies any surgical history    SOCIAL HISTORY  Social History     Tobacco Use   • Smoking status: Never Smoker   • Smokeless tobacco: Never Used   Substance Use Topics   • Alcohol use: Never     Frequency: Never   • Drug use: Never      Social History     Substance and Sexual Activity   Drug Use Never       FAMILY HISTORY  History reviewed. No pertinent family history.    CURRENT MEDICATIONS  Home Medications     Reviewed by Gregoria Clement, Pharmacy Intern (Pharmacy Intern) on 08/18/19 at 1111  Med List Status: Partial   Medication Last Dose Status   amLODIPine  (NORVASC) 10 MG Tab  Active   gabapentin (NEURONTIN) 300 MG Cap  Active   HYDROcodone-acetaminophen (NORCO) 5-325 MG Tab per tablet  Active   levothyroxine (SYNTHROID) 100 MCG Tab  Active   NIFEdipine (ADALAT CC) 30 MG CR tablet  Active                ALLERGIES  No Known Allergies    PHYSICAL EXAM  VITAL SIGNS: BP (!) 167/93   Pulse 82   Resp 17   Wt 65 kg (143 lb 4.8 oz)   SpO2 97%   Vitals reviewed.  Constitutional: Patient is awake but nonverbal.. Appears well-developed and well-nourished. No distress.    Head: Normocephalic and atraumatic.   Ears: Normal external ears bilaterally.   Mouth/Throat: Oropharynx is clear and moist  Eyes: Conjunctivae are normal. Pupils are equal, round, and reactive to light.   Neck: Normal range of motion. Neck supple.  Cardiovascular: Normal rate, irregularly irregular, normal peripheral pulses.  Pulmonary/Chest: Effort normal and breath sounds normal. No respiratory distress, no wheezes, rhonchi, or rales.  Abdominal: Soft. Bowel sounds are normal.  No rebound or guarding, or peritoneal signs  Musculoskeletal: No edema   Lymphadenopathy: No cervical adenopathy.   Neurological: Right-sided neglect.  NIH, greater than 25.  No movement of right upper and lower extremity.  Patient moves left upper extremity but not does not follow commands.  She is able to squeeze on command, her left hand.  No pronator drift.  Unable to assess visual fields.  Pupils equal.  Right-sided facial weakness.  Skin: Skin is warm and dry. No erythema. No pallor.   Psychiatric: Unable to assess    LABS  Results for orders placed or performed during the hospital encounter of 08/18/19   CBC WITH DIFFERENTIAL   Result Value Ref Range    WBC 9.8 4.8 - 10.8 K/uL    RBC 4.26 4.20 - 5.40 M/uL    Hemoglobin 13.5 12.0 - 16.0 g/dL    Hematocrit 40.2 37.0 - 47.0 %    MCV 94.4 81.4 - 97.8 fL    MCH 31.7 27.0 - 33.0 pg    MCHC 33.6 33.6 - 35.0 g/dL    RDW 42.9 35.9 - 50.0 fL    Platelet Count 225 164 - 446 K/uL    MPV  9.8 9.0 - 12.9 fL    Neutrophils-Polys 81.30 (H) 44.00 - 72.00 %    Lymphocytes 11.00 (L) 22.00 - 41.00 %    Monocytes 6.70 0.00 - 13.40 %    Eosinophils 0.30 0.00 - 6.90 %    Basophils 0.40 0.00 - 1.80 %    Immature Granulocytes 0.30 0.00 - 0.90 %    Nucleated RBC 0.00 /100 WBC    Neutrophils (Absolute) 7.93 (H) 2.00 - 7.15 K/uL    Lymphs (Absolute) 1.07 1.00 - 4.80 K/uL    Monos (Absolute) 0.65 0.00 - 0.85 K/uL    Eos (Absolute) 0.03 0.00 - 0.51 K/uL    Baso (Absolute) 0.04 0.00 - 0.12 K/uL    Immature Granulocytes (abs) 0.03 0.00 - 0.11 K/uL    NRBC (Absolute) 0.00 K/uL   COMP METABOLIC PANEL   Result Value Ref Range    Sodium 137 135 - 145 mmol/L    Potassium 3.4 (L) 3.6 - 5.5 mmol/L    Chloride 102 96 - 112 mmol/L    Co2 25 20 - 33 mmol/L    Anion Gap 10.0 0.0 - 11.9    Glucose 96 65 - 99 mg/dL    Bun 16 8 - 22 mg/dL    Creatinine 0.96 0.50 - 1.40 mg/dL    Calcium 8.8 8.5 - 10.5 mg/dL    AST(SGOT) 22 12 - 45 U/L    ALT(SGPT) 15 2 - 50 U/L    Alkaline Phosphatase 39 30 - 99 U/L    Total Bilirubin 1.0 0.1 - 1.5 mg/dL    Albumin 4.0 3.2 - 4.9 g/dL    Total Protein 7.2 6.0 - 8.2 g/dL    Globulin 3.2 1.9 - 3.5 g/dL    A-G Ratio 1.3 g/dL   PROTHROMBIN TIME   Result Value Ref Range    PT 13.8 12.0 - 14.6 sec    INR 1.03 0.87 - 1.13   APTT   Result Value Ref Range    APTT 50.1 (H) 24.7 - 36.0 sec   COD (ADULT)   Result Value Ref Range    ABO Grouping Only A     Rh Grouping Only POS     Antibody Screen-Cod NEG    TROPONIN   Result Value Ref Range    Troponin T 15 6 - 19 ng/L   URINALYSIS CULTURE, IF INDICATED   Result Value Ref Range    Color Yellow     Character Clear     Specific Gravity 1.023 <1.035    Ph 8.5 (A) 5.0 - 8.0    Glucose Negative Negative mg/dL    Ketones Trace (A) Negative mg/dL    Protein Negative Negative mg/dL    Bilirubin Negative Negative    Urobilinogen, Urine 0.2 Negative    Nitrite Negative Negative    Leukocyte Esterase Negative Negative    Occult Blood Trace (A) Negative    Micro Urine Req  Microscopic    ABO Rh Confirm   Result Value Ref Range    ABO Rh Confirm A POS    ESTIMATED GFR   Result Value Ref Range    GFR If African American >60 >60 mL/min/1.73 m 2    GFR If Non African American 55 (A) >60 mL/min/1.73 m 2   URINE MICROSCOPIC (W/UA)   Result Value Ref Range    WBC 0-2 /hpf    RBC 2-5 (A) /hpf    Bacteria Negative None /hpf    Epithelial Cells Negative /hpf    Hyaline Cast 0-2 /lpf   EKG (NOW)   Result Value Ref Range    Report       [facility] Emergency Dept.    Test Date:  2019  Pt Name:    CHAD PENDLETON              Department: ER  MRN:        [id number]                      Room:       [facility]  Gender:     Female                       Technician: [id number]  :        [id number]                   Requested By:AISSATOU JOHNSON  Order #:    [id number]                    Reading MD:    Measurements  Intervals                                Axis  Rate:       88                           P:  WV:                                      QRS:        84  QRSD:       84                           T:          62  QT:         392  QTc:        475    Interpretive Statements  ATRIAL FIBRILLATION  BORDERLINE RIGHT AXIS DEVIATION  CONSIDER LEFT VENTRICULAR HYPERTROPHY  No previous ECG available for comparison         All labs reviewed by me.    EKG Interpretation  Interpreted by me    Rhythm: AF  Rate: 88  Axis: LAD  Ectopy: none  Conduction: normal  ST Segments: no acute change  T Waves: no acute change  Q Waves: none    Clinical Impression: no acute changes and normal EKG    RADIOLOGY  DX-CHEST-PORTABLE (1 VIEW)   Final Result         1. No significant interval change. No pulmonary infiltrates or consolidations are noted.      CT-CTA HEAD WITH & W/O-POST PROCESS   Final Result         1. Complete occlusion of the proximal left HAZEL.      CRITICAL RESULT READ BACK: Preliminary findings discussed with and critical read back performed by Dr. AISSATOU JOHNSON in the Emergency Department via telephone  on 8/18/2019 10:54 AM . IR is informed      CT-CTA NECK WITH & W/O-POST PROCESSING   Final Result      1. Moderate calcified atherosclerotic disease of the left carotid bulb. No appreciable calcified atherosclerotic disease of the right carotid bulb.      2. No evidence of flow-limiting stenosis in the cervical carotid or cervical vertebral arteries.      CT-CEREBRAL PERFUSION ANALYSIS   Final Result      1.  Cerebral blood flow less than 30% in the anterior medial left frontal lobe, likely representing completed infarct = 8 mL.      2.  T Max more than 6 seconds, in the left frontal lobe, likely representing combination of completed infarct and ischemia = 44 mL.      3.  Mismatched volume likely representing ischemic brain/penumbra = 36      4.  Please note that the cerebral perfusion was performed on the limited brain tissue around the basal ganglia region. Infarct/ischemia outside the CT perfusion sections can be missed in this study.      CT-HEAD W/O   Final Result         1. No evidence of acute intracranial hemorrhage or mass lesion.      2. White matter lucencies most consistent with chronic small vessel ischemic change.               EC-ECHOCARDIOGRAM COMPLETE W/O CONT    (Results Pending)   MR-BRAIN-W/O    (Results Pending)   IR-THROMBO MECHANICAL ARTERY,INIT    (Results Pending)     The radiologist's interpretation of all radiological studies have been reviewed by me.    COURSE & MEDICAL DECISION MAKING  Pertinent Labs & Imaging studies reviewed. (See chart for details)    Obtained and reviewed past medical records, None in our EMR    Patient seen and examined at Charge desk on arrival.  Patient sent straight to CTA.  She has a very high NIH.  Will notify neurology and she will need admission to the hospital.  No family available at this time.  She has normal vital signs.      10:57 AM, discussed with Dr. Cote, radiologist regarding CT findings which show a left HAZEL occlusion.  Likely amenable to IR  "therapy.  He will contact Dr. Cordon, IR.    10:59 AM discussed with Dr. Steve, neurologist.  She was made aware of this patient, higher to her transfer.  We discussed CTA findings and plan for IR intervention likely.    11:01 AM discussed with Dr. Cordon, interventionalist, who believes this patient is a candidate.  We discussed clinical presentation.  He will activate the IR team.    Discussed with the patient's daughter.  She was tearful but very understanding.  She is on her way here she would like us to do everything we can to help her mother's condition.  She states she is not a \"normal 89-year-old\".  They hiked 2 miles yesterday.  She has no dementia and is a very active 89-year-old.  To her knowledge, she has no history of atrial fibrillation.  She apparently, had a physical with her PCP about a week ago and there was no evidence of atrial fibrillation so this is seemingly, brand-new.    11:06 AM discussed with Dr. Bales, hospitalist who agrees to admit the patient to their service.    11:17 AM, discussed with Dr. Shields, intensivist who is here in the department and will see the patient in consultation.    11:45 AM daughters at the bedside.  We discussed plan of care.  She feels as though the patient's more responsive now than she was at [facility].  IR nurse now at the bedside to transport patient.  No significant change from now compared to arrival.  Patient will go for IR thrombectomy.  She is in critical condition    The total critical care time on this patient is 55 minutes, resuscitating patient, speaking with admitting physician, and deciphering test results. This 55 minutes is exclusive of separately billable procedures.    FINAL IMPRESSION  1. 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 New onset a-fib (HCC)    Critical care time: 55 Minutes                "

## 2019-08-18 NOTE — ED TRIAGE NOTES
Chief Complaint   Patient presents with   • Possible Stroke     stroke transfer from [facility], Last Known Well at 10pm last night woke up having right sided weakness and aphasia. has NIHSS of 24 from transferring facility. has hx of HTN/thyroid disease/New onset of afib.     Door Time: 1023  Blood Thinner: NO  FS Glucose: 118   BP pta: 189/70  Last Know well: 10pm last night

## 2019-08-18 NOTE — CONSULTS
CC: Stroke/TIA    Consulting Physician: Dr. Yvette Waite DO.    8/18/19.      HPI:    Malinda Carolina is a 89 y.o. female who presents today in initial neurologic consultation for stroke. They are being referred by ED attending, Dr. Waite. The patient went to bed at 10pm the night PTA and awoke this morning with right hemiparesis and right gaze preference. She presented to [facility] ED where CT head was negative and NIHSS was calculated to be 24. She was transferred to [facility] where CTA showed an acute complete left HAZEL occlusion. In the ED at 11:05am, the patient's NIH was calculated at 22. She has new onset atrial fibrillation on telemtry monitoring. She is currently severely aphasic and mute, not following commands, easily arousable to minor stimulation. She has a right field cut and left gaze preference. Right leg weakness, unable to move right leg, has some movement in the left leg, but unable to follow commands.  Dr. Cordon with interventional neuroradiology has been consulted and is currently preparing the vascular lab for thrombectomy procedure.  She is not a TPA candidate because of unknown time of onset.      Time of symptom onset: Unknown. Woke up with symptoms.   Last Known Well: 2200 on 8/17/19.       ROS:   Unable to obtain due to patient's current mental status.    Past Medical History:   Past Medical History:   Diagnosis Date   • A-fib (HCC)     new onset   • Hypertension    • Hypothyroid        Past Surgical History: Unable to obtain at this time.  Social History:   Social History     Socioeconomic History   • Marital status:      Spouse name: Not on file   • Number of children: Not on file   • Years of education: Not on file   • Highest education level: Not on file   Occupational History   • Not on file   Social Needs   • Financial resource strain: Not on file   • Food insecurity:     Worry: Not on file     Inability: Not on file   • Transportation needs:     Medical: Not on file      Non-medical: Not on file   Tobacco Use   • Smoking status: Never Smoker   • Smokeless tobacco: Never Used   Substance and Sexual Activity   • Alcohol use: Never     Frequency: Never   • Drug use: Never   • Sexual activity: Not on file   Lifestyle   • Physical activity:     Days per week: Not on file     Minutes per session: Not on file   • Stress: Not on file   Relationships   • Social connections:     Talks on phone: Not on file     Gets together: Not on file     Attends Buddhism service: Not on file     Active member of club or organization: Not on file     Attends meetings of clubs or organizations: Not on file     Relationship status: Not on file   • Intimate partner violence:     Fear of current or ex partner: Not on file     Emotionally abused: Not on file     Physically abused: Not on file     Forced sexual activity: Not on file   Other Topics Concern   • Not on file   Social History Narrative   • Not on file       Family Hx: History reviewed. No pertinent family history.    Current Medications: No current facility-administered medications for this encounter.     Current Outpatient Medications:   •  amLODIPine (NORVASC) 10 MG Tab, Take 10 mg by mouth every day. Unknown dosage, Disp: , Rfl:   •  levothyroxine (SYNTHROID) 100 MCG Tab, Take 100 mcg by mouth Every morning on an empty stomach. Unknown dosage, Disp: , Rfl:   •  HYDROcodone-acetaminophen (NORCO) 5-325 MG Tab per tablet, Take 1-2 Tabs by mouth every four hours as needed., Disp: , Rfl:   •  NIFEdipine (ADALAT CC) 30 MG CR tablet, , Disp: , Rfl:   •  gabapentin (NEURONTIN) 300 MG Cap, , Disp: , Rfl:     Allergies: No Known Allergies      Physical Exam:   Ambulatory Vitals  Ambulatory Vitals  Encounter Vitals  Blood Pressure : (!) 178/107  Pulse: 85  Respiration: (!) 25  Pulse Oximetry: 98 %  Weight: 65 kg (143 lb 4.8 oz)  Weight Source: Estimated  Reason weight was either estimated or stated: Hemodynamically unstable      Constitutional:  Well-developed, well-nourished, good hygiene. Appears stated age.  Cardiovascular: RRR, with no murmurs, rubs or gallops. No carotid bruits. No peripheral edema, pedal pulses intact.   Respiratory: Lungs CTA B/L, no W/R/R.   Skin: Warm, dry, intact. No rashes observed.  Eyes: Sclera anicteric.  Eyes conjugate.  Neurologic:   Mental Status: Easily arousable to minor stimulation.   Speech: Global aphasia.   Memory: Unable to assess.   Concentration: Does not follow any commands.   Cranial Nerves:    CN II: PERRL. No afferent pupillary defect.    CN III, IV, VI: Good eye closure, EOMI.     CN V: + Corneals..     CN VII: Right facial weakness.    CN VIII: Hearing intact.     CN IX and X: + Cough and gag.    CN XI: Unable to assess- shoulders equal in height.    CN XII: Unable to assess.    Sensory: Withdraws from pain in all 4 extremities.   Coordination: No evidence of past-pointing on finger to nose testing, no dysdiadochokinesia. Heel to shin intact.    DTR's: 2+ throughout without clonus.    Babinski: Toes upgoing bilaterally.   Movements: No tremors observed.   Musculoskeletal:    Strength: Able to sustain left arm against gravity for full 10 seconds.  Some movement in the right arm, but does not lift-difficulty following commands.  Slight movement in the right leg but unable to lift against gravity.  Also moves left leg, but due to aphasia unable to follow commands.   Tone: Normal bulk and decreased tone in the right lower extremity.    NIHSS score at 11:05am in ED pre-procedure:  1a. LOC: 1  1b. LOC Questions: 2  1c. LOC Commands: 2    2. Best Gaze: 1  3. Visual Fields: 2  4. Facial Paresis: 1  5a. Motor arm left: 0  5b. Motor arm right: 2  6a. Motor leg left: 3  6b. Motor leg right: 2  7. Sensory:1  8. Best Language: 3  9. Limb Ataxia: 0  10. Dysarthria: 2  11. Extinction/Inattention: 0    Total Score: 22    Labs:    Imaging reviewed:  Noncontrast head CT from 8/18/19.  1. No evidence of acute intracranial  hemorrhage or mass lesion.  2. White matter lucencies most consistent with chronic small vessel ischemic change.    CTA Head 8/18/19.  1. Complete occlusion of the proximal left HAZEL.     CTA Neck 8/18/19.    1. Moderate calcified atherosclerotic disease of the left carotid bulb. No appreciable calcified atherosclerotic disease of the right carotid bulb.    2. No evidence of flow-limiting stenosis in the cervical carotid or cervical vertebral arteries.         Assessment/Plan:  89 y.o. female presents with acute left HAZEL cardioembolic stroke.   Decision to give alteplase: No  If 'NO', rationale: Unknown time of onset.  Patient is a thrombectomy candidate.    Presumed mechanism by TOAST:  __Large Artery Atherosclerosis  __Small Vessel (Lacunar)  X Cardioembolic  __Other (Sickle Cell, Vasculitis, Hypercoagulable)  __Unknown      No problem-specific Assessment & Plan notes found for this encounter.    89-year-old female with new onset atrial fibrillation presents with right hemiparesis and CTA showing complete left HAZEL occlusion.  She is not a candidate for TPA given unknown time of onset, currently being prepared for thrombectomy.  Currently her NIH stroke scale score is 22.      Plan:  Patient going to IR for thrombectomy.  Will eventually need MRI without contrast.  Will need anticoagulation for new onset atrial fibrillation.  Stat echocardiogram to rule out LV thrombus.  Continue to monitor on telemetry.  Permissive Hypertension less than 185/110.  Initiate aspirin therapy.  300 mg MA.  Speech evaluation.  Patient likely unsafe to swallow at this time.  Likely will be unable to take statin orally.  Check labs: Hgb A1C, lipid profile, sed rate, TSH.  Physical/Occupational Therapy Evaluation.  Will need ICU level care.  Neurology following      Fariba Steve D.O., M.P.H  MS specialist.   Board Certified Neurologist.  Neurology Clerkship Director, [facility].     Neurology,  [facility].   Tel: [phone]  Fax: [phone]'

## 2019-08-18 NOTE — ASSESSMENT & PLAN NOTE
Per family this is reportedly new however she had been having some worsening shortness of breath and fatigue of the last few prior days.  Rate control  Avoid anticoagulants secondary to hemorrhagic conversion noted on MRI of the brain and moderate to large size CVA.

## 2019-08-18 NOTE — ASSESSMENT & PLAN NOTE
Neuro checks per protocol, immediate neuroimaging for any changes in neuro status  MRI, shows extensive left frontal infarct with hemorrhagic transformation small area of right thalamic involvement.  Aspiration, and fall precautions   NPO pending dysphagia screen  SLP/PT/OT evaluation  lipid panel, HbA1c, Troponin, Coags, routine labs  Atorvastatin, held due to allergy  Glucose control to maintain <180  Goal SBP <160 mmHg; titrate nicardipine to meet goals  Palliative care consulting  Prognosis poor.

## 2019-08-18 NOTE — DISCHARGE PLANNING
Transitional Care Coordination   Referral from Dr. Alfaro  Dx:  acute left HAZEL cardioembolic stroke. New onset of a-fib.     S/P  Thrombectomy of Left A-2    PT/OT/SLP consults pending.   Pt lives alone; independent; very active-hiked 2 miles yesterday. Supportive family.     This referral will not be sent to [facility] for a consult at this time  Will review PT/OT/SLP evaluations upon completion to determine if referral will be sent to Physiatry. Will follow. .     Thank you for referral.

## 2019-08-18 NOTE — ASSESSMENT & PLAN NOTE
Patient was found in the morning in her bed and thus was not TPA candidate.  She did have a thrombectomy.  MRI shows moderate to large sized left frontal ischemic infarct with some hemorrhagic component.  I have reviewed the MRI imaging with the family.  They state patient would not like to continue living like this would likely move toward hospice care.  They would still like to have further input from the neurologist.  I have notified Dr. Steve and she we will try to round back again on the family  I will go ahead and place case management orders to help family possibly arrange for home hospice as they would like to take her home  Would hold off in any enteral feeding for now  Family states she had significant reaction in the past to statin.  No current recommendation for statin based on potential allergy  Aspirin if okay by neurology based on hemorrhagic finding on MRI  Allowing for some permissive hypertension

## 2019-08-18 NOTE — PROGRESS NOTES
Obtained pt from IR at approximately 1310.  Pt moving R-side and left lower extremity spontaneously, l-arm slight withdrawal.  Pt not following commands for me at this time.  Left gaze noted.  Groin site CDI and soft.  After patient transferred to bed and getting ready to transport pt up to RICU, BP was 202/110, labetalol given.  Frieda JEFFERS RN escorted pt's family to RICU waiting room.  Pt admitted into R117 at approximately 1335.

## 2019-08-18 NOTE — ASSESSMENT & PLAN NOTE
Mild improvement with fluids initially  8/19 BUN 17 creatinine 0.96 GFR 55 which is an improvement from 8/18 GFR 42

## 2019-08-18 NOTE — ED NOTES
Unable to obtain med rec at this time   Family is on their way with medications   Will complete med rec at that time

## 2019-08-19 ENCOUNTER — APPOINTMENT (OUTPATIENT)
Dept: RADIOLOGY | Facility: MEDICAL CENTER | Age: 84
DRG: 023 | End: 2019-08-19
Attending: HOSPITALIST
Payer: MEDICARE

## 2019-08-19 ENCOUNTER — APPOINTMENT (OUTPATIENT)
Dept: RADIOLOGY | Facility: MEDICAL CENTER | Age: 84
DRG: 023 | End: 2019-08-19
Attending: INTERNAL MEDICINE
Payer: MEDICARE

## 2019-08-19 ENCOUNTER — APPOINTMENT (OUTPATIENT)
Dept: CARDIOLOGY | Facility: MEDICAL CENTER | Age: 84
DRG: 023 | End: 2019-08-19
Attending: HOSPITALIST
Payer: MEDICARE

## 2019-08-19 LAB
ANION GAP SERPL CALC-SCNC: 13 MMOL/L (ref 0–11.9)
BUN SERPL-MCNC: 17 MG/DL (ref 8–22)
CALCIUM SERPL-MCNC: 9 MG/DL (ref 8.5–10.5)
CHLORIDE SERPL-SCNC: 99 MMOL/L (ref 96–112)
CHOLEST SERPL-MCNC: 222 MG/DL (ref 100–199)
CO2 SERPL-SCNC: 22 MMOL/L (ref 20–33)
CREAT SERPL-MCNC: 0.96 MG/DL (ref 0.5–1.4)
EKG IMPRESSION: NORMAL
ERYTHROCYTE [DISTWIDTH] IN BLOOD BY AUTOMATED COUNT: 42.1 FL (ref 35.9–50)
GLUCOSE SERPL-MCNC: 135 MG/DL (ref 65–99)
HCT VFR BLD AUTO: 37.9 % (ref 37–47)
HDLC SERPL-MCNC: 72 MG/DL
HGB BLD-MCNC: 13.1 G/DL (ref 12–16)
LDLC SERPL CALC-MCNC: 135 MG/DL
LV EJECT FRACT  99904: 65
LV EJECT FRACT MOD 2C 99903: 65.69
LV EJECT FRACT MOD 4C 99902: 63.52
LV EJECT FRACT MOD BP 99901: 64.65
MCH RBC QN AUTO: 31.9 PG (ref 27–33)
MCHC RBC AUTO-ENTMCNC: 34.6 G/DL (ref 33.6–35)
MCV RBC AUTO: 92.2 FL (ref 81.4–97.8)
PLATELET # BLD AUTO: 224 K/UL (ref 164–446)
PMV BLD AUTO: 9.7 FL (ref 9–12.9)
POTASSIUM SERPL-SCNC: 3.7 MMOL/L (ref 3.6–5.5)
RBC # BLD AUTO: 4.11 M/UL (ref 4.2–5.4)
SODIUM SERPL-SCNC: 134 MMOL/L (ref 135–145)
TRIGL SERPL-MCNC: 76 MG/DL (ref 0–149)
WBC # BLD AUTO: 11.3 K/UL (ref 4.8–10.8)

## 2019-08-19 PROCEDURE — 99233 SBSQ HOSP IP/OBS HIGH 50: CPT | Performed by: HOSPITALIST

## 2019-08-19 PROCEDURE — 85027 COMPLETE CBC AUTOMATED: CPT

## 2019-08-19 PROCEDURE — 93306 TTE W/DOPPLER COMPLETE: CPT | Mod: 26 | Performed by: INTERNAL MEDICINE

## 2019-08-19 PROCEDURE — 770022 HCHG ROOM/CARE - ICU (200)

## 2019-08-19 PROCEDURE — 700111 HCHG RX REV CODE 636 W/ 250 OVERRIDE (IP): Performed by: HOSPITALIST

## 2019-08-19 PROCEDURE — 700102 HCHG RX REV CODE 250 W/ 637 OVERRIDE(OP): Performed by: INTERNAL MEDICINE

## 2019-08-19 PROCEDURE — 93306 TTE W/DOPPLER COMPLETE: CPT

## 2019-08-19 PROCEDURE — 99291 CRITICAL CARE FIRST HOUR: CPT | Performed by: INTERNAL MEDICINE

## 2019-08-19 PROCEDURE — 80048 BASIC METABOLIC PNL TOTAL CA: CPT

## 2019-08-19 PROCEDURE — 70551 MRI BRAIN STEM W/O DYE: CPT

## 2019-08-19 PROCEDURE — 700105 HCHG RX REV CODE 258: Performed by: INTERNAL MEDICINE

## 2019-08-19 PROCEDURE — 700101 HCHG RX REV CODE 250: Performed by: INTERNAL MEDICINE

## 2019-08-19 PROCEDURE — A9270 NON-COVERED ITEM OR SERVICE: HCPCS | Performed by: HOSPITALIST

## 2019-08-19 PROCEDURE — A9270 NON-COVERED ITEM OR SERVICE: HCPCS | Performed by: INTERNAL MEDICINE

## 2019-08-19 PROCEDURE — 700105 HCHG RX REV CODE 258: Performed by: HOSPITALIST

## 2019-08-19 PROCEDURE — 700102 HCHG RX REV CODE 250 W/ 637 OVERRIDE(OP): Performed by: HOSPITALIST

## 2019-08-19 PROCEDURE — 80061 LIPID PANEL: CPT

## 2019-08-19 RX ORDER — MORPHINE SULFATE 10 MG/ML
10 INJECTION, SOLUTION INTRAMUSCULAR; INTRAVENOUS
Status: DISCONTINUED | OUTPATIENT
Start: 2019-08-19 | End: 2019-08-20 | Stop reason: HOSPADM

## 2019-08-19 RX ORDER — ACETAMINOPHEN 325 MG/1
650 TABLET ORAL EVERY 4 HOURS PRN
Status: DISCONTINUED | OUTPATIENT
Start: 2019-08-19 | End: 2019-08-20 | Stop reason: HOSPADM

## 2019-08-19 RX ORDER — MORPHINE SULFATE 10 MG/ML
5 INJECTION, SOLUTION INTRAMUSCULAR; INTRAVENOUS
Status: DISCONTINUED | OUTPATIENT
Start: 2019-08-19 | End: 2019-08-20 | Stop reason: HOSPADM

## 2019-08-19 RX ORDER — POLYETHYLENE GLYCOL 3350 17 G/17G
1 POWDER, FOR SOLUTION ORAL
Status: DISCONTINUED | OUTPATIENT
Start: 2019-08-19 | End: 2019-08-19

## 2019-08-19 RX ORDER — ASPIRIN 300 MG/1
300 SUPPOSITORY RECTAL DAILY
Status: DISCONTINUED | OUTPATIENT
Start: 2019-08-20 | End: 2019-08-19

## 2019-08-19 RX ORDER — LORAZEPAM 2 MG/ML
1 INJECTION INTRAMUSCULAR
Status: DISCONTINUED | OUTPATIENT
Start: 2019-08-19 | End: 2019-08-20 | Stop reason: HOSPADM

## 2019-08-19 RX ORDER — ASPIRIN 325 MG
325 TABLET ORAL DAILY
Status: DISCONTINUED | OUTPATIENT
Start: 2019-08-20 | End: 2019-08-19

## 2019-08-19 RX ORDER — ONDANSETRON 4 MG/1
8 TABLET, ORALLY DISINTEGRATING ORAL EVERY 8 HOURS PRN
Status: DISCONTINUED | OUTPATIENT
Start: 2019-08-19 | End: 2019-08-20 | Stop reason: HOSPADM

## 2019-08-19 RX ORDER — GABAPENTIN 100 MG/1
100 CAPSULE ORAL 3 TIMES DAILY
Status: DISCONTINUED | OUTPATIENT
Start: 2019-08-19 | End: 2019-08-19

## 2019-08-19 RX ORDER — POLYVINYL ALCOHOL 14 MG/ML
2 SOLUTION/ DROPS OPHTHALMIC EVERY 6 HOURS PRN
Status: DISCONTINUED | OUTPATIENT
Start: 2019-08-19 | End: 2019-08-20 | Stop reason: HOSPADM

## 2019-08-19 RX ORDER — ONDANSETRON 4 MG/1
4 TABLET, ORALLY DISINTEGRATING ORAL EVERY 4 HOURS PRN
Status: DISCONTINUED | OUTPATIENT
Start: 2019-08-19 | End: 2019-08-19

## 2019-08-19 RX ORDER — BISACODYL 10 MG
10 SUPPOSITORY, RECTAL RECTAL
Status: DISCONTINUED | OUTPATIENT
Start: 2019-08-19 | End: 2019-08-19

## 2019-08-19 RX ORDER — ATROPINE SULFATE 10 MG/ML
2 SOLUTION/ DROPS OPHTHALMIC EVERY 4 HOURS PRN
Status: DISCONTINUED | OUTPATIENT
Start: 2019-08-19 | End: 2019-08-20 | Stop reason: HOSPADM

## 2019-08-19 RX ORDER — AMOXICILLIN 250 MG
2 CAPSULE ORAL 2 TIMES DAILY
Status: DISCONTINUED | OUTPATIENT
Start: 2019-08-19 | End: 2019-08-19

## 2019-08-19 RX ORDER — ACETAMINOPHEN 650 MG/1
650 SUPPOSITORY RECTAL EVERY 4 HOURS PRN
Status: DISCONTINUED | OUTPATIENT
Start: 2019-08-19 | End: 2019-08-20 | Stop reason: HOSPADM

## 2019-08-19 RX ORDER — MAGNESIUM SULFATE HEPTAHYDRATE 40 MG/ML
2 INJECTION, SOLUTION INTRAVENOUS ONCE
Status: COMPLETED | OUTPATIENT
Start: 2019-08-19 | End: 2019-08-19

## 2019-08-19 RX ORDER — ATORVASTATIN CALCIUM 40 MG/1
40 TABLET, FILM COATED ORAL EVERY EVENING
Status: DISCONTINUED | OUTPATIENT
Start: 2019-08-19 | End: 2019-08-19

## 2019-08-19 RX ORDER — LORAZEPAM 2 MG/ML
1 CONCENTRATE ORAL
Status: DISCONTINUED | OUTPATIENT
Start: 2019-08-19 | End: 2019-08-20 | Stop reason: HOSPADM

## 2019-08-19 RX ORDER — ONDANSETRON 2 MG/ML
8 INJECTION INTRAMUSCULAR; INTRAVENOUS EVERY 8 HOURS PRN
Status: DISCONTINUED | OUTPATIENT
Start: 2019-08-19 | End: 2019-08-20 | Stop reason: HOSPADM

## 2019-08-19 RX ORDER — ACETAMINOPHEN 325 MG/1
650 TABLET ORAL EVERY 6 HOURS PRN
Status: DISCONTINUED | OUTPATIENT
Start: 2019-08-19 | End: 2019-08-19

## 2019-08-19 RX ORDER — ASPIRIN 81 MG/1
324 TABLET, CHEWABLE ORAL DAILY
Status: DISCONTINUED | OUTPATIENT
Start: 2019-08-20 | End: 2019-08-19

## 2019-08-19 RX ADMIN — LABETALOL HYDROCHLORIDE 10 MG: 5 INJECTION, SOLUTION INTRAVENOUS at 09:08

## 2019-08-19 RX ADMIN — ASPIRIN 300 MG: 300 SUPPOSITORY RECTAL at 12:32

## 2019-08-19 RX ADMIN — GABAPENTIN 100 MG: 100 CAPSULE ORAL at 17:25

## 2019-08-19 RX ADMIN — SENNOSIDES, DOCUSATE SODIUM 2 TABLET: 50; 8.6 TABLET, FILM COATED ORAL at 17:25

## 2019-08-19 RX ADMIN — MAGNESIUM SULFATE IN WATER 2 G: 40 INJECTION, SOLUTION INTRAVENOUS at 10:23

## 2019-08-19 RX ADMIN — SODIUM CHLORIDE, POTASSIUM CHLORIDE, SODIUM LACTATE AND CALCIUM CHLORIDE: 600; 310; 30; 20 INJECTION, SOLUTION INTRAVENOUS at 09:08

## 2019-08-19 RX ADMIN — LABETALOL HYDROCHLORIDE 10 MG: 5 INJECTION, SOLUTION INTRAVENOUS at 08:03

## 2019-08-19 RX ADMIN — LABETALOL HYDROCHLORIDE 10 MG: 5 INJECTION, SOLUTION INTRAVENOUS at 07:34

## 2019-08-19 RX ADMIN — SODIUM CHLORIDE 5 MG/HR: 9 INJECTION, SOLUTION INTRAVENOUS at 12:51

## 2019-08-19 RX ADMIN — LABETALOL HYDROCHLORIDE 10 MG: 5 INJECTION, SOLUTION INTRAVENOUS at 11:03

## 2019-08-19 RX ADMIN — SODIUM CHLORIDE 2.5 MG/HR: 9 INJECTION, SOLUTION INTRAVENOUS at 02:11

## 2019-08-19 ASSESSMENT — LIFESTYLE VARIABLES
EVER HAD A DRINK FIRST THING IN THE MORNING TO STEADY YOUR NERVES TO GET RID OF A HANGOVER: NO
ON A TYPICAL DAY WHEN YOU DRINK ALCOHOL HOW MANY DRINKS DO YOU HAVE: 1
DOES PATIENT WANT TO STOP DRINKING: NO
TOTAL SCORE: 0
EVER FELT BAD OR GUILTY ABOUT YOUR DRINKING: NO
EVER_SMOKED: NEVER
HAVE YOU EVER FELT YOU SHOULD CUT DOWN ON YOUR DRINKING: NO
TOTAL SCORE: 0
HAVE PEOPLE ANNOYED YOU BY CRITICIZING YOUR DRINKING: NO
AVERAGE NUMBER OF DAYS PER WEEK YOU HAVE A DRINK CONTAINING ALCOHOL: 1
HOW MANY TIMES IN THE PAST YEAR HAVE YOU HAD 5 OR MORE DRINKS IN A DAY: 0
CONSUMPTION TOTAL: NEGATIVE
ALCOHOL_USE: YES
TOTAL SCORE: 0

## 2019-08-19 ASSESSMENT — COGNITIVE AND FUNCTIONAL STATUS - GENERAL
TURNING FROM BACK TO SIDE WHILE IN FLAT BAD: A LOT
EATING MEALS: TOTAL
DAILY ACTIVITIY SCORE: 6
MOBILITY SCORE: 9
HELP NEEDED FOR BATHING: TOTAL
MOVING FROM LYING ON BACK TO SITTING ON SIDE OF FLAT BED: A LOT
MOVING TO AND FROM BED TO CHAIR: A LOT
WALKING IN HOSPITAL ROOM: TOTAL
DRESSING REGULAR LOWER BODY CLOTHING: TOTAL
TOILETING: TOTAL
DRESSING REGULAR UPPER BODY CLOTHING: TOTAL
PERSONAL GROOMING: TOTAL
SUGGESTED CMS G CODE MODIFIER DAILY ACTIVITY: CN
SUGGESTED CMS G CODE MODIFIER MOBILITY: CM
CLIMB 3 TO 5 STEPS WITH RAILING: TOTAL
STANDING UP FROM CHAIR USING ARMS: TOTAL

## 2019-08-19 NOTE — CARE PLAN
Problem: Knowledge Deficit  Goal: Knowledge of disease process/condition, treatment plan, diagnostic tests, and medications will improve  Outcome: PROGRESSING AS EXPECTED     Problem: Discharge Barriers/Planning  Goal: Patient's continuum of care needs will be met  Outcome: PROGRESSING AS EXPECTED     Problem: Knowledge Deficit:  Goal: Knowledge of disease process/condition, treatment plan, diagnostic tests, and medications will improve  Outcome: PROGRESSING AS EXPECTED     Educated family about plan of care and current medical regimen. Palliative care spoke with family. Family verbalizing understanding and wants to see the results of the MRI and echo and have a new discussion about goals of care at that point.

## 2019-08-19 NOTE — THERAPY
Physical Therapy Contact Note    PT order received and acknowledged. Per chart and RN patient with active bed rest orders following thrombectomy. Will attempt PT eval once patient able to participate with OOB activity.    Juanita Dc, PT, DPT  [phone]

## 2019-08-19 NOTE — CARE PLAN
Problem: Nutritional:  Goal: Dysphagia will improve  Outcome: PROGRESSING SLOWER THAN EXPECTED  Intervention: Collaborate with SLP to determine appropriate adaptation for safe administration of medications and oral nutrition  Note:   Collaborate with Speech therapy to determine appropriate adaptation for safe administration of medications and oral nutrition       Problem: Mobility:  Goal: Capacity to carry out activities will improve  Outcome: PROGRESSING SLOWER THAN EXPECTED  Note:   Pt on strict bedrest, <24 hours post thrombectomy

## 2019-08-19 NOTE — PROGRESS NOTES
Cortrak Placement    Tube Team verified patient name and medical record number prior to tube placement.  Cortrak tube (43 inches, 10 [place]) placed at 75 cm in right nare.  Per Cortrak picture, tube appears to be in the stomach.  Nursing Instructions: Awaiting KUB to confirm placement before use for medications or feeding. Once placement confirmed, flush tube with 30 ml of water, and then remove and save stylet, in patient medication drawer.

## 2019-08-19 NOTE — PALLIATIVE CARE
PALLIATIVE CARE FOLLOW-UP:    Joined family conference already in progress with Dr. Steve's team.  All family agreeable to transition to comfort care at this time and would like to get pt home on hospice asap as it was pt's wish to die at home.  Discussed logistics of acceptance by hospice agency, transport to [place], and possible insurance issues - need for family to meet with  in the morning.  Also discussed that if MD thought that pt would not survive transport, then it would not happen as this would not peaceful for pt.  Family verbalized understanding but very focused on getting pt home quickly to be near family and fulfill pt's wishes.    Discussed with/Updated: Dr. Baeza         Plan:  Palliative Care to f/u with  in morning.    Thank you for allowing Palliative Care to support this pt and her family.  Contact [phone] for additional assistance, patient status change, questions or concerns.

## 2019-08-19 NOTE — PROGRESS NOTES
Critical Care Progress Note    Date of admission  8/18/2019    Chief Complaint  89 y.o. female admitted 8/18/2019 with right-sided weakness    Hospital Course    Transferred from outside facility to [facility].  CTA revealed occlusion of the proximal left HAZEL.  She went to the interventional radiology suite with Dr. Pop where a thrombectomy of the left A2 was performed.      Interval Problem Update  Reviewed last 24 hour events:              - acute events overnight              - Tm: Afebrile              - HR: 70-80, Afib (new?)              - SBP: 120-140s              - Neuro: Eyes open spont, does not follow, gaze preference              - GI: N.p.o., no BM              - UOP: Adequate              - Santoyo: Yes              - Lines: 2 PIV, LR at 100ml/hr              - PPx: SCD, no VTE, no GI indicated               Infusions:  Titrating nicardipine    Labetalol prn x 3    Review of Systems  Review of Systems   Unable to perform ROS: Mental acuity        Vital Signs for last 24 hours   Temp:  [36.6 °C (97.8 °F)-38.2 °C (100.8 °F)] 36.8 °C (98.3 °F)  Pulse:  [] 80  Resp:  [11-44] 11  BP: (102-172)/() 111/51  SpO2:  [90 %-99 %] 97 %    Hemodynamic parameters for last 24 hours       Respiratory Information for the last 24 hours       Physical Exam   Physical Exam   Constitutional:   Lying in bed no acute distress   HENT:   Head: Normocephalic and atraumatic.   Mouth/Throat: Oropharynx is clear and moist.   Eyes: Pupils are equal, round, and reactive to light.   Neck: Neck supple.   Cardiovascular: Exam reveals no gallop and no friction rub.   No murmur heard.  Irregularly irregular   Pulmonary/Chest: Effort normal. No respiratory distress. She has no wheezes. She has no rales.   Abdominal: Soft. She exhibits no distension. There is no tenderness. There is no rebound.   Musculoskeletal: She exhibits no edema.   Neurological:   Somnolent withdraws from pain.  Moves her left upper  and lower extremities spontaneously she does not follow commands   Skin: Skin is warm and dry.       Medications  Current Facility-Administered Medications   Medication Dose Route Frequency Provider Last Rate Last Dose   • Pharmacy Consult: Enteral tube insertion - review meds/change route/product selection  1 Each Other PHARMACY TO DOSE Steven Carey M.D.       • [START ON 8/20/2019] aspirin (ASA) chewable tab 324 mg  324 mg Enteral Tube DAILY Steven Carey M.D.        Or   • [START ON 8/20/2019] aspirin (ASA) tablet 325 mg  325 mg Oral DAILY Steven Carey M.D.        Or   • [START ON 8/20/2019] aspirin (ASA) suppository 300 mg  300 mg Rectal DAILY Steven Carey M.D.       • gabapentin (NEURONTIN) capsule 100 mg  100 mg Enteral Tube TID Steven Carey M.D.   100 mg at 08/19/19 1725   • senna-docusate (PERICOLACE or SENOKOT S) 8.6-50 MG per tablet 2 Tab  2 Tab Enteral Tube BID Steven Carey M.D.   2 Tab at 08/19/19 1725    And   • polyethylene glycol/lytes (MIRALAX) PACKET 1 Packet  1 Packet Enteral Tube QDAY PRN Steven Carey M.D.        And   • magnesium hydroxide (MILK OF MAGNESIA) suspension 30 mL  30 mL Enteral Tube QDAY PRN Steven Carey M.D.        And   • bisacodyl (DULCOLAX) suppository 10 mg  10 mg Rectal QDAY PRN Steven Carey M.D.       • acetaminophen (TYLENOL) tablet 650 mg  650 mg Enteral Tube Q6HRS PRN Steven Carey M.D.       • ondansetron (ZOFRAN ODT) dispertab 4 mg  4 mg Enteral Tube Q4HRS PRN Steven Carey M.D.       • levothyroxine (SYNTHROID) tablet 100 mcg  100 mcg Oral AM ES Abdiaziz Alfaro D.O.   Stopped at 08/19/19 0600   • Respiratory Care per Protocol   Nebulization Continuous RT Abdiaziz Alfaro D.O.       • lactated ringers infusion   Intravenous Continuous RAFFAELE [place].O. 100 mL/hr at 08/19/19 1600     • ondansetron (ZOFRAN) syringe/vial injection 4 mg  4 mg Intravenous Q4HRS PRN Abdiaziz Alfaro D.O.       • niCARdipine (CARDENE)  25 mg in  mL Infusion  2.5-15 mg/hr Intravenous Continuous Steven Shields Jr., D.O.   Stopped at 08/19/19 1541   • hydrALAZINE (APRESOLINE) injection 20 mg  20 mg Intravenous Q6HRS PRN Nahid Cordon M.D.       • labetalol (NORMODYNE,TRANDATE) injection 10 mg  10 mg Intravenous Q HOUR PRN Steven Shields Jr., D.O.   10 mg at 08/19/19 1103       Fluids    Intake/Output Summary (Last 24 hours) at 8/19/2019 1734  Last data filed at 8/19/2019 1600  Gross per 24 hour   Intake 2833.33 ml   Output 1920 ml   Net 913.33 ml       Laboratory      Recent Labs     08/18/19  0845   TROPONINI <0.02     Recent Labs     08/18/19  0845 08/18/19  1026 08/19/19  0322   SODIUM 136 137 134*   POTASSIUM 4.3 3.4* 3.7   CHLORIDE 97* 102 99   CO2 30* 25 22   BUN 20* 16 17   CREATININE 1.2* 0.96 0.96   MAGNESIUM  --  1.7  --    CALCIUM 10.0 8.8 9.0     Recent Labs     08/18/19  0845 08/18/19  1026 08/19/19  0322   ALTSGPT 29 15  --    ASTSGOT 28 22  --    ALKPHOSPHAT 58 39  --    TBILIRUBIN 1.2 1.0  --    GLUCOSE 118* 96 135*     Recent Labs     08/18/19  0845 08/18/19  1026 08/19/19  0322   WBC 10.2 9.8 11.3*   NEUTSPOLYS  --  81.30*  --    LYMPHOCYTES  --  11.00*  --    MONOCYTES  --  6.70  --    EOSINOPHILS  --  0.30  --    BASOPHILS  --  0.40  --    ASTSGOT 28 22  --    ALTSGPT 29 15  --    ALKPHOSPHAT 58 39  --    TBILIRUBIN 1.2 1.0  --      Recent Labs     08/18/19  0845 08/18/19  1026 08/19/19  0322   RBC 4.86 4.26 4.11*   HEMOGLOBIN 15.5 13.5 13.1   HEMATOCRIT 45.0 40.2 37.9   PLATELETCT 250 225 224   PROTHROMBTM  --  13.8  --    APTT  --  50.1*  --    INR  --  1.03  --        Imaging  MRI:   Reviewed    Assessment/Plan  Right hemiparesis (HCC)  Assessment & Plan  Due to left HAZEL occlusion  See embolic occlusion of left HAZEL plan    JATIN (acute kidney injury) (HCC)- (present on admission)  Assessment & Plan  IVF  Renal dose meds, avoid nephrotoxins  Strict I/Os  Follow renal function    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  Aspiration, and fall precautions   NPO pending dysphagia screen  SLP/PT/OT evaluation  lipid panel, HbA1c, Troponin, Coags, routine labs  Atorvastatin, held due to allergy  Glucose control to maintain <180  Goal SBP <160 mmHg; titrate nicardipine to meet goals  Palliative care consulting  Prognosis poor.    AF (atrial fibrillation) (HCC)- (present on admission)  Assessment & Plan  New diagnosis  Cardiac monitoring  TSH pending  Echo  Rate control, unknown onset       VTE:  Contraindicated hemorrhagic transformation  Ulcer: Not Indicated  Lines: None    I have performed a physical exam and reviewed and updated ROS and Plan today (8/19/2019). In review of yesterday's note (8/18/2019), there are no changes except as documented above.     Discussed patient condition and risk of morbidity and/or mortality with Hospitalist, RN, RT,  and Charge nurse / hot rounds    This patient is critically ill.  She remains at high risk for worsening central nervous system dysfunction, requiring frequent neurological assessment and strict blood pressure control.  I am titrating her nicardipine infusion I have assessed and reassessed the neurologic exam, blood pressure, and hemodynamics throughout the day.      Critical care time 40 minutes in directly providing and coordinating critical care and extensive data review.  No time overlap and excludes procedures.

## 2019-08-19 NOTE — CONSULTS
Reason for PC Consult: Advance Care Planning    Consulted by: Steven Shields MD    Assessment:  General: 89yo lady transferred from [employer], admitted 8/18 through ED with CVA, new onset Afib.  CTA imaged proximal left HAZEL cardioembolic occlusion, s/p emergent thrombectomy and to ICU, echo completed this morning but results not up yet, awaiting MRI, cortrak to be placed for meds/nutrition, nicardipine gtt re-started.  PMH: HTN, hypothyroid    Dyspnea:  -    Last BM: (pta)-    Pain: Unable to determine-    Depression: Unable to determine-      Spiritual:  Is Latter-day or spirituality important for coping with this illness? Yes- order placed for spiritual visit.  Family stated that alysha is source of strength for pt and them - would appreciate  visit.  Has a  or spiritual provider visit been requested? Yes    Palliative Performance Scale: 30    Advanced Directive: None- Dtr Monica is DPOA-HC - she has documents at home in [place].  DPOA:  -    POLST:No-      Code Status: Full- Addressed at this encounter - family stated change in code status to DNR/DNI - family stated their brother Scott is also in agreement.    Social:   Pt has lived in [facility] [place] her dtr Monica's property in [place], since the death of her  two years ago.  Pt's other children are Paris from [place], Alberto from [place], and Scott from [place].  She has 9 grandchildren and 6 great grandchildren.  Pt is very active in community organizations and still has many friends.  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      Outcome:  [facility] Monica & Paris [facility] Introduced self and role of Palliative Care.  Assessed family's understanding of pt's current medical status, overall health picture, and options for future care. Family well-versed in pt's acute issues and PMH - both dtrs are RNs.      Explored family's/pt's expressed values, beliefs, and preferences in order to identify  "GOC.  Family had in depth conversations with pt re: QoL/EoL issues after the difficult time during their father's dying experience.  Being active, involved, industrious and independent are primary motivators for pt.  She recently bought a new car, created a garden around her cottage, changes diapers on the grand babies, hiked two miles at [facility] the day prior to stroke, and performs all ADLs. Don stated, \"She couldn't live her life any st[place].\"  Family all in agreement that pt would not see quality in a life where her functional status was much below 100% of her previous impressive baseline. They are agreeing to cortrak temporarily but would not want long term.    Family hopeful that echo and MRI will provide some definitive answers, but verbalized understanding that it may be more watching pt's clinical picture over next few days.  Comfort care and hospice discussed as possible future care options.  Family aware of hospice support as their dad was on hospice, albeit only for days.  Monica would want to take pt home on hospice.    Active listening, reflection, reminiscing, validation & normalization, empathic support and therapeutic touch utilized throughout this encounter.  All questions answered.  PC contact information given.     Discussed with/Updated: Dr.s Baeza and , ICU CHRISTIANO Urias    Plan:  DNR/DNI.  Further ACP after results echo/MRI.  Palliative care to continue to follow, provide support, and help facilitate decision-making as clinical picture evolves.      Thank you for allowing Palliative Care to participate in this patient's care. Please feel free to call [phone] with any questions or concerns.  "

## 2019-08-19 NOTE — PROGRESS NOTES
Pt arrived to R116 from IR transported via bed on monitor by charge RN Paris.     Report received from both Frieda ALVARADO and Paris ALVARADO.     Bedside neuro performed.      Family at bedside, .

## 2019-08-19 NOTE — THERAPY
Attempted to see pt for clinical swallow eval, however RN reports pt is still not appropriate at this time.  SLP will attempt again later when pt is able to participate.

## 2019-08-19 NOTE — PROGRESS NOTES
Hospital Medicine Daily Progress Note    Date of Service  8/19/2019    Chief Complaint  Family found patient unresponsive in bed in am.  Last seen neurologically intact the night prior.    Hospital Course    89 y.o. female admitted 8/18/2019 with left HAZEL distribution CVA and had a thrombectomy.  The MRI brain showed significant right frontal and temporal lobe infarct with a component of hemorrhagic conversion with smaller infarcts too.       Interval Problem Update  Titrating nicardipine (off and on today)  ECHO without any signs of any vegetations or thrombus  Opens eyes to pain/stimulus  Not following commands  Right side withdraws to pain  Afib w/ rate control  Tmax:100.8  On 3L NC  NPO  Adequate UOP  Bedrest   MRI brain reviewed with family.  Family leaning toward home hospice.  Family would like to hear neurology input      Consultants/Specialty  Neurology  Pulmonary    Code Status  FULL    Disposition      Review of Systems  Review of Systems   Unable to perform ROS: Patient unresponsive        Physical Exam  Temp:  [36.6 °C (97.8 °F)-38.2 °C (100.8 °F)] 36.8 °C (98.3 °F)  Pulse:  [] 80  Resp:  [11-44] 11  BP: (102-172)/() 111/51  SpO2:  [90 %-99 %] 97 %    Physical Exam   Constitutional: She appears well-developed and well-nourished. No distress.   HENT:   Head: Normocephalic and atraumatic.   Nose: Nose normal.   Mouth/Throat: No oropharyngeal exudate.   Eyes: Right eye exhibits no discharge. Left eye exhibits no discharge. No scleral icterus.   Neck: No tracheal deviation present.   Cardiovascular: Normal rate, regular rhythm and normal heart sounds.   No murmur heard.  Pulses:       Radial pulses are 2+ on the right side, and 2+ on the left side.        Dorsalis pedis pulses are 2+ on the right side, and 2+ on the left side.   Pulmonary/Chest: Effort normal. No respiratory distress. She has no wheezes. She has no rales.   Abdominal: Soft. Bowel sounds are normal. She exhibits no distension.  There is no tenderness.   Musculoskeletal: She exhibits no edema.   Neurological: She is unresponsive. A cranial nerve deficit is present. Coordination abnormal.   Right side neglect, right side paralysis, some spontaneous movement of the left side.   Skin: Skin is warm. She is not diaphoretic. No erythema.   Psychiatric: Cognition and memory are impaired. She is noncommunicative.   Vitals reviewed.      Fluids    Intake/Output Summary (Last 24 hours) at 8/19/2019 1624  Last data filed at 8/19/2019 1600  Gross per 24 hour   Intake 2833.33 ml   Output 1920 ml   Net 913.33 ml       Laboratory  Recent Labs     08/18/19  0845 08/18/19  1026 08/19/19  0322   WBC 10.2 9.8 11.3*   RBC 4.86 4.26 4.11*   HEMOGLOBIN 15.5 13.5 13.1   HEMATOCRIT 45.0 40.2 37.9   MCV 92.6 94.4 92.2   MCH 31.9 31.7 31.9   MCHC 34.4 33.6 34.6   RDW 12.4 42.9 42.1   PLATELETCT 250 225 224   MPV 10.0 9.8 9.7     Recent Labs     08/18/19  0845 08/18/19  1026 08/19/19  0322   SODIUM 136 137 134*   POTASSIUM 4.3 3.4* 3.7   CHLORIDE 97* 102 99   CO2 30* 25 22   GLUCOSE 118* 96 135*   BUN 20* 16 17   CREATININE 1.2* 0.96 0.96   CALCIUM 10.0 8.8 9.0     Recent Labs     08/18/19  1026   APTT 50.1*   INR 1.03         Recent Labs     08/19/19  0322   TRIGLYCERIDE 76   HDL 72   *       Imaging  MR-BRAIN-W/O   Final Result      1.  There are moderate-sized acute infarcts in the left frontal lobe, left basal ganglia and left temporal lobe. Hemorrhagic transformation(HT 1)  is noted in the left frontal and basal ganglia infarct. There is also small acute infarct in the right    parietal white matter.   2.  Moderate cerebral atrophy.   3.  Moderate chronic microvascular ischemic disease.         EC-ECHOCARDIOGRAM COMPLETE W/O CONT   Final Result      OUTSIDE IMAGES-CT HEAD   Final Result      OUTSIDE IMAGES-DX CHEST   Final Result      IR-THROMBO MECHANICAL ARTERY,INIT   Final Result         1.  Occlusion of the A2 segment of the left anterior cerebral  artery.      2.  Successful cerebral thrombectomy performed with post intervention angiogram demonstrating widely patent anterior cerebral arteries.               DX-CHEST-PORTABLE (1 VIEW)   Final Result         1. No significant interval change. No pulmonary infiltrates or consolidations are noted.      CT-CTA HEAD WITH & W/O-POST PROCESS   Final Result         1. Complete occlusion of the proximal left HAZEL.      CRITICAL RESULT READ BACK: Preliminary findings discussed with and critical read back performed by Dr. AISSATOU JOHNSON in the Emergency Department via telephone on 8/18/2019 10:54 AM . IR is informed      CT-CTA NECK WITH & W/O-POST PROCESSING   Final Result      1. Moderate calcified atherosclerotic disease of the left carotid bulb. No appreciable calcified atherosclerotic disease of the right carotid bulb.      2. No evidence of flow-limiting stenosis in the cervical carotid or cervical vertebral arteries.      CT-CEREBRAL PERFUSION ANALYSIS   Final Result      1.  Cerebral blood flow less than 30% in the anterior medial left frontal lobe, likely representing completed infarct = 8 mL.      2.  T Max more than 6 seconds, in the left frontal lobe, likely representing combination of completed infarct and ischemia = 44 mL.      3.  Mismatched volume likely representing ischemic brain/penumbra = 36      4.  Please note that the cerebral perfusion was performed on the limited brain tissue around the basal ganglia region. Infarct/ischemia outside the CT perfusion sections can be missed in this study.      CT-HEAD W/O   Final Result         1. No evidence of acute intracranial hemorrhage or mass lesion.      2. White matter lucencies most consistent with chronic small vessel ischemic change.               DX-ABDOMEN FOR TUBE PLACEMENT    (Results Pending)        Assessment/Plan  Right hemiparesis (HCC)  Assessment & Plan  Secondary to the left HAZEL occlusion.  MRI showing moderate to large left frontal/temporal  infarct with component of hemorrhagic conversion.  Await further input from neurology but family looking toward potential hospice.  Will place orders for case management to look toward possible home hospice setting.    JATIN (acute kidney injury) (HCC)- (present on admission)  Assessment & Plan  Mild improvement with fluids initially  8/19 BUN 17 creatinine 0.96 GFR 55 which is an improvement from 8/18 GFR 42    HTN (hypertension)  Assessment & Plan  Permissive hypertension for the next 24 to 48 hours  On nicardipine drip to keep systolic blood pressure less than 140 and diastolic blood pressure less than 105.      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  She did have a thrombectomy.  MRI shows moderate to large sized left frontal ischemic infarct with some hemorrhagic component.  I have reviewed the MRI imaging with the family.  They state patient would not like to continue living like this would likely move toward hospice care.  They would still like to have further input from the neurologist.  I have notified Dr. Steve and she we will try to round back again on the family  I will go ahead and place case management orders to help family possibly arrange for home hospice as they would like to take her home  Would hold off in any enteral feeding for now  Family states she had significant reaction in the past to statin.  No current recommendation for statin based on potential allergy  Aspirin if okay by neurology based on hemorrhagic finding on MRI  Allowing for some permissive hypertension    AF (atrial fibrillation) (HCC)- (present on admission)  Assessment & Plan  Per family this is reportedly new however she had been having some worsening shortness of breath and fatigue of the last few prior days.  Rate control  Avoid anticoagulants secondary to hemorrhagic conversion noted on MRI of the brain and moderate to  large size CVA.       VTE prophylaxis: SCD's

## 2019-08-19 NOTE — DIETARY
Nutrition Services: Nutrition Support Assessment  Day 1 of admit.  Malinda Carolina is a 89 y.o. female with admitting DX of Embolic occlusion of left anterior cerebral artery, Atrial fibrillation     Current problem list:  1. Atrial fibrillation  2. Embolic occlusion of left anterior cerebral artery  3. HTN  4. JATIN  5. Right hemiparesis     Assessment:  Estimated Nutritional Needs: based on: Ht: 166 cm, Wt : 58.6 kg via bed scale, IBW: 57.8 kg, BMI: 21.27 - Normal     Calculation/Equation: REE per MSJ x 1.2 = 1216 kcal/day  Total Calories / day: 1200 - 1500 (Calories / k - 26)  Total Grams Protein / day: 64 - 76 (Grams Protein / k.1 - 1.3)  Total Fluids ml / day: 1467.6 ml    Evaluation:   1. Consult received for TF assessment.   2. Per SLP note pt was not appropriate for evaluation this morning.   3. Enteral access: Cortrak placed - waiting to be verified.    4. Labs: Na 134, Glucose 135  5. Meds: neurontin, synthroid, pericolace  6. Fluids: lactated ringers infusion @ 100 mL/hr  7. Pt would benefit from standard TF formula at this time.      Malnutrition Risk: No criteria noted at this time.      Recommendations/Plan:  Start Fibersource HN @ 25 ml/hr and advance per protocol to 50 ml/hr (goal rate) to provide 1440 kcal (25 kcal/kg), 65 grams protein (1.1 gm/kg), and 972 ml free water per day.   Fluids per MD.   Diet upgrades per SLP as feasible.        RD following

## 2019-08-20 VITALS
TEMPERATURE: 97.3 F | SYSTOLIC BLOOD PRESSURE: 176 MMHG | RESPIRATION RATE: 22 BRPM | BODY MASS INDEX: 21.6 KG/M2 | OXYGEN SATURATION: 95 % | WEIGHT: 129.63 LBS | HEIGHT: 65 IN | DIASTOLIC BLOOD PRESSURE: 96 MMHG | HEART RATE: 85 BPM

## 2019-08-20 PROCEDURE — 99239 HOSP IP/OBS DSCHRG MGMT >30: CPT | Performed by: HOSPITALIST

## 2019-08-20 PROCEDURE — 700111 HCHG RX REV CODE 636 W/ 250 OVERRIDE (IP): Performed by: HOSPITALIST

## 2019-08-20 RX ADMIN — MORPHINE SULFATE 5 MG: 10 INJECTION INTRAVENOUS at 11:10

## 2019-08-20 NOTE — PROGRESS NOTES
Pt discharged to home with family and hospice. Patient transported via stretcher to Family SUV vehicle. Daughters, Paris and Monica along with their brother in law lifted patient up from stretcher and placed into front passenger seat. All belongings sent with patient.    PIV removed. Santoyo remains in place.    Daughter, Paris, signed discharge paperwork.    Family says that hospice is expected to arrive at their home in [place] at 3:30pm.

## 2019-08-20 NOTE — DIETARY
Nutrition Services:  RD previously following patient. Patient now transitioned to comfort care. Re-consult RD as needed.     RD available prn.

## 2019-08-20 NOTE — DISCHARGE PLANNING
Confirmed that hospital bed has been deliverd to pt's home in [place]. [facility] to initiate care at 1500 today.

## 2019-08-20 NOTE — DISCHARGE SUMMARY
Discharge Summary    CHIEF COMPLAINT ON ADMISSION  Chief Complaint   Patient presents with   • Possible Stroke     stroke transfer from [facility] at 10pm last night woke up having right sided weakness and aphasia. has NIHSS of 24 from transferring facility. has hx of HTN/thyroid disease/New onset of afib.       Reason for Admission  EMS     Admission Date  8/18/2019    CODE STATUS  Comfort Care/DNR    HPI & HOSPITAL COURSE  This is a 89 y.o. female here with right-sided weakness and aphasia.    Ms Carolina has past medical history which includes atrial fibrillation, hypertension, and hypothyroidism.  Despite her advanced age, up until this hospitalization the patient had been quite independent and active.  On 8/18/2019 she was found unresponsive by her family EMS was called, they noted right-sided hemiparesis and the patient was taken to the emergency room for evaluation at [facility] in [place], [place].  Patient had CT scan imaging, there were concerns for an acute stroke, thrombolytics were not given due to unknown time of onset.  She was transferred to [facility] for neurology coverage and higher level of care.  Neurology was consulted and IR intervention was recommended, on 8/18/2019 she underwent successful mechanical thrombectomy of left A-2.  The patient was transferred to the ICU.  Further imaging demonstrated that the patient had multiple moderate-sized infarcts.  Goals of care discussion was held with the patient's family.  They felt strongly that the patient would not want to continue aggressive care and requested hospice consultation.    On 8/20/2019 I met with the patient's daughter Paris at the bedside.  We discussed patient's condition with palliative care consultant and the bedside RN.  Paris and her sister Monica are requesting that the patient be discharged home today, hospice is coming to their house at 3:00 PM.  They have some  information that ambulance transportation would not be covered by insurance and would leave them with a large bill, this information has not been verified by case management at this time and they are aware of this.  Paris and Monica would like to transport the patient home by private vehicle.  They have a large SUV.  Monica plans to drive and Paris will sit next to the patient on the way home.  We reviewed the patient's medication needs, she has not received anything for pain, nausea or other symptom the last 24-hours.  Patient remains hypertensive but systolic blood pressure is in the 170s.  She is on room air.  I discussed my concerns about transportation by private vehicle with Paris.  These included decompensation and possible uncontrolled symptoms.  Paris still wishes to take her home today    After discussion I believe that the patient's family is acting to fulfill the patient's wishes to be home as soon as possible.  They will be a short gap where she does not have access to medication during transfer and the family is aware of that risk and is willing to take it.  In reality the patient's will be seen by hospice but will really be cared for by her family as soon as she returns home.  My recommendation was for the patient to be transported by ambulance but I believe that transportation by private vehicle is a reasonable risk for the patient and the family is acting as they believe she would ask.  For this reason, she will be discharged home today to the care of her daughters with hospice expected to arrive at the home at 3:00pm.    Therefore, she is discharged in guarded and stable condition to hospice.    The patient met 2-midnight criteria for an inpatient stay at the time of discharge.    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 *      FOLLOW UP  Follow up with hospice on return home    MEDICATIONS ON DISCHARGE     Medication List      STOP taking these medications    aspirin 325 MG Tabs  Commonly known as:  ASA     CALCIUM PO     fish oil 1000 MG Caps capsule     HYDROcodone-acetaminophen 5-325 MG Tabs per tablet  Commonly known as:  NORCO     levothyroxine 100 MCG Tabs  Commonly known as:  SYNTHROID     NIFEdipine 30 MG CR tablet  Commonly known as:  ADALAT CC            Allergies  Allergies   Allergen Reactions   • Augmentin Rash     Generalized rash   • Hctz [Hydrochlorothiazide]    • Losartan      Frozen shoulder   • Pcn [Penicillins] Rash     Generalized rash   • Simvastatin      Shoulder pain, numbness       DIET  Orders Placed This Encounter   Procedures   • Diet NPO     Standing Status:   Standing     Number of Occurrences:   1     Order Specific Question:   Restrict to:     Answer:   With Tube Feed [4]     Comments:   Medications via enteral tube.  Hold enteral feeding except for medications for now.       ACTIVITY  Expect bed rest/hospice care  As tolerated    CONSULTATIONS  Critical Care    PROCEDURES  MRI brain 8/19/2019:  1.  There are moderate-sized acute infarcts in the left frontal lobe, left basal ganglia and left temporal lobe. Hemorrhagic transformation(HT 1)  is noted in the left frontal and basal ganglia infarct. There is also small acute infarct in the right   parietal white matter.  2.  Moderate cerebral atrophy.  3.  Moderate chronic microvascular ischemic disease.    LABORATORY  Lab Results   Component Value Date    SODIUM 134 (L) 08/19/2019    POTASSIUM 3.7 08/19/2019    CHLORIDE 99 08/19/2019    CO2 22 08/19/2019    GLUCOSE 135 (H) 08/19/2019    BUN 17 08/19/2019    CREATININE 0.96 08/19/2019        Lab Results   Component Value Date     WBC 11.3 (H) 08/19/2019    HEMOGLOBIN 13.1 08/19/2019    HEMATOCRIT 37.9 08/19/2019    PLATELETCT 224 08/19/2019        Total time of the discharge process exceeds 45 minutes.

## 2019-08-20 NOTE — DISCHARGE PLANNING
Received Choice form at 1120  Agency/Facility Name: [facility]  Referral sent per Choice form @ 1123 at fax [phone].

## 2019-08-20 NOTE — PROGRESS NOTES
Pt with discharge orders to home with family and hospice. Family refusing EMS transport and wanting to transport patient by personal SUV vehicle. Family educated and aware of risks of transporting by themselves to [place].     Family aware that they will be responsible to transfer the patient from stretcher into car. Patient's daughters, Paris and Monica called brother in law to come to help with transfer.

## 2019-08-20 NOTE — CARE PLAN
Problem: Knowledge Deficit  Goal: Knowledge of disease process/condition, treatment plan, diagnostic tests, and medications will improve  Outcome: PROGRESSING AS EXPECTED     Problem: Discharge Barriers/Planning  Goal: Patient's continuum of care needs will be met  Outcome: PROGRESSING AS EXPECTED     Problem: Knowledge Deficit:  Goal: Knowledge of disease process/condition, treatment plan, diagnostic tests, and medications will improve  Outcome: PROGRESSING AS EXPECTED     Educated family about comfort care measures and discharge process. SW involved for discharge planning. Family is anxious.

## 2019-08-20 NOTE — DISCHARGE INSTRUCTIONS
Discharge Instructions    Discharged to home by car with relative. Discharged via stretcher, hospital escort: Yes.  Special equipment needed: Not Applicable    Be sure to schedule a follow-up appointment with your primary care doctor or any specialists as instructed.     Discharge Plan:   Influenza Vaccine Indication: Not indicated: Previously immunized this influenza season and > 8 years of age    I understand that a diet low in cholesterol, fat, and sodium is recommended for good health. Unless I have been given specific instructions below for another diet, I accept this instruction as my diet prescription.   Other diet: NPO    Special Instructions: None    · Is patient discharged on Warfarin / Coumadin?   No     Depression / Suicide Risk    As you are discharged from this [facility] facility, it is important to learn how to keep safe from harming yourself.    Recognize the warning signs:  · Abrupt changes in personality, positive or negative- including increase in energy   · Giving away possessions  · Change in eating patterns- significant weight changes-  positive or negative  · Change in sleeping patterns- unable to sleep or sleeping all the time   · Unwillingness or inability to communicate  · Depression  · Unusual sadness, discouragement and loneliness  · Talk of wanting to die  · Neglect of personal appearance   · Rebelliousness- reckless behavior  · Withdrawal from people/activities they love  · Confusion- inability to concentrate     If you or a loved one observes any of these behaviors or has concerns about self-harm, here's what you can do:  · Talk about it- your feelings and reasons for harming yourself  · Remove any means that you might use to hurt yourself (examples: pills, rope, extension cords, firearm)  · Get professional help from the community (Mental Health, Substance Abuse, psychological counseling)  · Do not be alone:Call your Safe Contact- someone whom you trust who will be there for  you.  · Call your local CRISIS HOTLINE 987-1850 or 469-734-9059  · Call your local Children's Mobile Crisis Response Team [place] (857) 255-5610 or www.Oscar  · Call the toll free National Suicide Prevention Hotlines   · National Suicide Prevention Lifeline 152-593-ZQJN (3287)  · National [employer] Network 800-SUICIDE (310-9218)

## 2019-08-20 NOTE — PALLIATIVE CARE
PALLIATIVE CARE FOLLOW-UP:    POLST for DNR/comfort/no artificial nutrition/no IVF signed by pt's dtr/DPOA-HC Monica Arana and Dr. Quintana, scanned to [facility], original to family.    Pt's dtrs' goal is for pt to be at home asap as they are certain this is their mother's wish.  Clear answer on Medicare paying for transport has not yet been obtained.  Family stated that are accepting the risk of transporting pt themselves, including that she could die on the way and/or have symptoms of discomfort (ie. pain, nausea, anxiety) that could have been treated on EMS transport.  IDT also expressed concern for family's emotional needs should transport not go smoothly in their care.  Dtrs Monica and Paris are both RNs and stated feeling comfortable with transporting their mom and accepting the liability of any issue that could occur.    Discussed with/Updated: Dr. Quintana, ICU RN Bridgett, CHRISTIANO  Pat         Plan:  Family transport home, bed is being delivered, hospice is meeting them at home at 1500.    Thank you for allowing Palliative Care to support this pt and her family.  Contact [phone] for additional assistance, patient status change, questions or concerns.

## 2019-08-20 NOTE — DISCHARGE PLANNING
Received message from Palliative Care that family is anxious to take pt home to [place] under hospice care. Family familiar with [facility] agency as they cared for pt's spouse. Spoke to [facility] and they can begin care today. Hospital bed being delivered to home and hospice nurse, Brenda plans to be at pt's home at 3pm today. Family (2 daughters Monica and Paris) state they are nurses and would like to provide transportation in their private vehicle. Estimate for [facility] transport is $7376.00 and per [facility], [employer] is unlikely to cover. Discussed with Dr. Quintana, he will allow family to transport.

## 2019-08-20 NOTE — CARE PLAN
Problem: Safety  Goal: Will remain free from injury  Outcome: PROGRESSING AS EXPECTED  Note:   Implement bed alarm, q1h rounding     Problem: Skin Integrity  Goal: Risk for impaired skin integrity will decrease  Outcome: PROGRESSING AS EXPECTED  Note:   Turn q2h and assess skin     Problem: Safety:  Goal: Will remain free from injury  Outcome: PROGRESSING AS EXPECTED  Note:   Implement bed alarm, q1h rounding